# Patient Record
Sex: FEMALE | Race: BLACK OR AFRICAN AMERICAN | NOT HISPANIC OR LATINO | Employment: FULL TIME | URBAN - METROPOLITAN AREA
[De-identification: names, ages, dates, MRNs, and addresses within clinical notes are randomized per-mention and may not be internally consistent; named-entity substitution may affect disease eponyms.]

---

## 2023-04-13 LAB
ABO GROUP (TYPE) IN BLOOD: NORMAL
ANTIBODY SCREEN: NORMAL
CHORIOGONADOTROPIN (MIU/ML) IN SER/PLAS: 561 IU/L
RH FACTOR: NORMAL

## 2023-04-14 LAB — URINE CULTURE: NO GROWTH

## 2023-04-21 ENCOUNTER — APPOINTMENT (OUTPATIENT)
Dept: LAB | Facility: LAB | Age: 28
End: 2023-04-21
Payer: COMMERCIAL

## 2023-04-21 LAB — CHORIOGONADOTROPIN (MIU/ML) IN SER/PLAS: ABNORMAL IU/L

## 2023-05-10 LAB
ABO GROUP (TYPE) IN BLOOD: NORMAL
ANTIBODY SCREEN: NORMAL
ERYTHROCYTE DISTRIBUTION WIDTH (RATIO) BY AUTOMATED COUNT: 14.1 % (ref 11.5–14.5)
ERYTHROCYTE MEAN CORPUSCULAR HEMOGLOBIN CONCENTRATION (G/DL) BY AUTOMATED: 31.2 G/DL (ref 32–36)
ERYTHROCYTE MEAN CORPUSCULAR VOLUME (FL) BY AUTOMATED COUNT: 87 FL (ref 80–100)
ERYTHROCYTES (10*6/UL) IN BLOOD BY AUTOMATED COUNT: 4.69 X10E12/L (ref 4–5.2)
HEMATOCRIT (%) IN BLOOD BY AUTOMATED COUNT: 41 % (ref 36–46)
HEMOGLOBIN (G/DL) IN BLOOD: 12.8 G/DL (ref 12–16)
HEMOGLOBIN A2: 3.1 %
HEMOGLOBIN A: 96.5 %
HEMOGLOBIN F: 0.4 %
HEMOGLOBIN IDENTIFICATION INTERPRETATION: NORMAL
HEPATITIS B VIRUS SURFACE AG PRESENCE IN SERUM: NONREACTIVE
HEPATITIS C VIRUS AB PRESENCE IN SERUM: NONREACTIVE
HIV 1/ 2 AG/AB SCREEN: NONREACTIVE
LEUKOCYTES (10*3/UL) IN BLOOD BY AUTOMATED COUNT: 5.6 X10E9/L (ref 4.4–11.3)
NRBC (PER 100 WBCS) BY AUTOMATED COUNT: 0 /100 WBC (ref 0–0)
PATH REVIEW-HGB IDENTIFICATION: NORMAL
PLATELETS (10*3/UL) IN BLOOD AUTOMATED COUNT: 278 X10E9/L (ref 150–450)
REFLEX ADDED, ANEMIA PANEL: ABNORMAL
RH FACTOR: NORMAL
RUBELLA VIRUS IGG AB: NORMAL
SYPHILIS TOTAL AB: NONREACTIVE

## 2023-08-17 ENCOUNTER — HOSPITAL ENCOUNTER (OUTPATIENT)
Dept: DATA CONVERSION | Facility: HOSPITAL | Age: 28
End: 2023-08-18
Attending: OBSTETRICS & GYNECOLOGY
Payer: COMMERCIAL

## 2023-08-17 DIAGNOSIS — F43.10 POST-TRAUMATIC STRESS DISORDER, UNSPECIFIED: ICD-10-CM

## 2023-08-17 DIAGNOSIS — R10.30 LOWER ABDOMINAL PAIN, UNSPECIFIED: ICD-10-CM

## 2023-08-17 DIAGNOSIS — F41.9 ANXIETY DISORDER, UNSPECIFIED: ICD-10-CM

## 2023-08-17 DIAGNOSIS — R10.2 PELVIC AND PERINEAL PAIN: ICD-10-CM

## 2023-08-17 DIAGNOSIS — Z88.8 ALLERGY STATUS TO OTHER DRUGS, MEDICAMENTS AND BIOLOGICAL SUBSTANCES: ICD-10-CM

## 2023-08-17 DIAGNOSIS — G43.909 MIGRAINE, UNSPECIFIED, NOT INTRACTABLE, WITHOUT STATUS MIGRAINOSUS: ICD-10-CM

## 2023-08-17 DIAGNOSIS — O99.352 DISEASES OF THE NERVOUS SYSTEM COMPLICATING PREGNANCY, SECOND TRIMESTER (HHS-HCC): ICD-10-CM

## 2023-08-17 DIAGNOSIS — Z3A.22 22 WEEKS GESTATION OF PREGNANCY (HHS-HCC): ICD-10-CM

## 2023-08-17 DIAGNOSIS — O26.892 OTHER SPECIFIED PREGNANCY RELATED CONDITIONS, SECOND TRIMESTER (HHS-HCC): ICD-10-CM

## 2023-08-17 DIAGNOSIS — O99.342 OTHER MENTAL DISORDERS COMPLICATING PREGNANCY, SECOND TRIMESTER (HHS-HCC): ICD-10-CM

## 2023-08-18 LAB
ABO GROUP (TYPE) IN BLOOD: NORMAL
ACTIVATED PARTIAL THROMBOPLASTIN TIME IN PPP BY COAGULATION ASSAY: 31 SEC (ref 27–38)
ALANINE AMINOTRANSFERASE (SGPT) (U/L) IN SER/PLAS: 8 U/L (ref 7–45)
ALBUMIN (G/DL) IN SER/PLAS: 3.8 G/DL (ref 3.4–5)
ALKALINE PHOSPHATASE (U/L) IN SER/PLAS: 60 U/L (ref 33–110)
ANION GAP IN SER/PLAS: 14 MMOL/L (ref 10–20)
ANTIBODY SCREEN: NORMAL
ASPARTATE AMINOTRANSFERASE (SGOT) (U/L) IN SER/PLAS: 11 U/L (ref 9–39)
BILIRUBIN TOTAL (MG/DL) IN SER/PLAS: 0.3 MG/DL (ref 0–1.2)
CALCIUM (MG/DL) IN SER/PLAS: 8.9 MG/DL (ref 8.6–10.6)
CARBON DIOXIDE, TOTAL (MMOL/L) IN SER/PLAS: 23 MMOL/L (ref 21–32)
CHLAMYDIA TRACH., AMPLIFIED: NEGATIVE
CHLORIDE (MMOL/L) IN SER/PLAS: 106 MMOL/L (ref 98–107)
CLUE CELLS WET PREP: PRESENT
CREATININE (MG/DL) IN SER/PLAS: 0.55 MG/DL (ref 0.5–1.05)
ERYTHROCYTE DISTRIBUTION WIDTH (RATIO) BY AUTOMATED COUNT: 13.7 % (ref 11.5–14.5)
ERYTHROCYTE MEAN CORPUSCULAR HEMOGLOBIN CONCENTRATION (G/DL) BY AUTOMATED: 33.9 G/DL (ref 32–36)
ERYTHROCYTE MEAN CORPUSCULAR VOLUME (FL) BY AUTOMATED COUNT: 85 FL (ref 80–100)
ERYTHROCYTES (10*6/UL) IN BLOOD BY AUTOMATED COUNT: 4.4 X10E12/L (ref 4–5.2)
FIBRINOGEN (MG/DL) IN PPP BY COAGULATION ASSAY: 295 MG/DL (ref 200–400)
GFR FEMALE: >90 ML/MIN/1.73M2
GLUCOSE (MG/DL) IN SER/PLAS: 71 MG/DL (ref 74–99)
HEMATOCRIT (%) IN BLOOD BY AUTOMATED COUNT: 37.2 % (ref 36–46)
HEMOGLOBIN (G/DL) IN BLOOD: 12.6 G/DL (ref 12–16)
INR IN PPP BY COAGULATION ASSAY: 1.1 (ref 0.9–1.1)
LEUKOCYTES (10*3/UL) IN BLOOD BY AUTOMATED COUNT: 8.1 X10E9/L (ref 4.4–11.3)
N. GONORRHEA, AMPLIFIED: NEGATIVE
NRBC (PER 100 WBCS) BY AUTOMATED COUNT: 0 /100 WBC (ref 0–0)
PLATELETS (10*3/UL) IN BLOOD AUTOMATED COUNT: 251 X10E9/L (ref 150–450)
POTASSIUM (MMOL/L) IN SER/PLAS: 3.8 MMOL/L (ref 3.5–5.3)
PROTEIN TOTAL: 6.6 G/DL (ref 6.4–8.2)
PROTHROMBIN TIME (PT) IN PPP BY COAGULATION ASSAY: 12 SEC (ref 9.8–12.8)
RH FACTOR: NORMAL
SODIUM (MMOL/L) IN SER/PLAS: 139 MMOL/L (ref 136–145)
TRICH WET PREP: ABNORMAL
TRICHOMONAS REFLEX COMMENT: ABNORMAL
TRICHOMONAS VAGINALIS: NEGATIVE
UREA NITROGEN (MG/DL) IN SER/PLAS: 5 MG/DL (ref 6–23)
WBC WET PREP: ABNORMAL /HPF
YEAST PRESENCE WET PREP: ABNORMAL

## 2023-09-19 LAB
ANTIBODY SCREEN: NORMAL
ERYTHROCYTE DISTRIBUTION WIDTH (RATIO) BY AUTOMATED COUNT: 13.7 % (ref 11.5–14.5)
ERYTHROCYTE MEAN CORPUSCULAR HEMOGLOBIN CONCENTRATION (G/DL) BY AUTOMATED: 31.7 G/DL (ref 32–36)
ERYTHROCYTE MEAN CORPUSCULAR VOLUME (FL) BY AUTOMATED COUNT: 89 FL (ref 80–100)
ERYTHROCYTES (10*6/UL) IN BLOOD BY AUTOMATED COUNT: 4.05 X10E12/L (ref 4–5.2)
HEMATOCRIT (%) IN BLOOD BY AUTOMATED COUNT: 36 % (ref 36–46)
HEMOGLOBIN (G/DL) IN BLOOD: 11.4 G/DL (ref 12–16)
LEUKOCYTES (10*3/UL) IN BLOOD BY AUTOMATED COUNT: 7.1 X10E9/L (ref 4.4–11.3)
NRBC (PER 100 WBCS) BY AUTOMATED COUNT: 0 /100 WBC (ref 0–0)
PLATELETS (10*3/UL) IN BLOOD AUTOMATED COUNT: 242 X10E9/L (ref 150–450)
REFLEX ADDED, ANEMIA PANEL: ABNORMAL

## 2023-09-29 VITALS
RESPIRATION RATE: 18 BRPM | HEIGHT: 67 IN | OXYGEN SATURATION: 98 % | WEIGHT: 194 LBS | BODY MASS INDEX: 30.45 KG/M2 | DIASTOLIC BLOOD PRESSURE: 74 MMHG | SYSTOLIC BLOOD PRESSURE: 115 MMHG | TEMPERATURE: 97.7 F | HEART RATE: 91 BPM

## 2023-09-30 NOTE — PROGRESS NOTES
"    Current Stage:   Stage: Antepartum     Subjective Data:   Antepartum:  Antepartum:    on he whole pregnancy, able to tolerate by mouth.29yo [id number] at 22.4wga (by 8wk US) presenting for abdominal pain.     Patient states she felt the baby \"drop,\" and since that time has been having severe lower abdominal and vaginal pain. Feels like a \"jolt\" of pain in her vagina. Denies f/c, diarrhea/constipation, or dysuria. Denies ctx, LOF, VB, or dec FM. Has had nausea  off and on he whole pregnancy, able to tolerate po.    Patient also endorses thick mucousy yellow discharge. Denies vaginal irritation or burning.     Pregnancy notable for:  - possible h/o CVA? no records. patient believes \"due to stress\"; not on anticoagilation    - subcorionic hematoma, being followed on US per pt.  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  PSH: laparoscopic endo ablation x2, hand surgery  Meds: none  Allergies: ASA/NSAIDS (GI irritation/bleeding), topomax  FHx: reviewed and noncontributory  Social: denies e/t/d        Objective Information:    Objective Information:      T   P  R  BP   MAP  SpO2   Value  36.6  80  18  122/79   97  95%  Date/Time  0:01  0:06  0:01  0:01   0:01  0:06  Range  (36.5C - 36.6C )  (76 - 91 )  (18 - 18 )  (115 - 122 )/ (74 - 79 )  (97 - 97 )  (95% - 100% )      Pain reported at  2:00: 0 = None      Physical Exam:   Constitutional: Alert, well-appearing   Obstetric: SSE: scant physiologic discharge, no lesions;  cervix appears normal, no pooling, nitrizine negative    SVE: 0/40/-3  Doppler tones 145  TOCO: quiet    BSUS:   - fetus in cephalic presentation  - fundal placenta   Eyes: EOMI   Respiratory/Thorax: breathing comfortably on room  air "   Cardiovascular: WWP   Gastrointestinal: Patient jumped with pain in response  to lower abdominal exam, however when exam was repeated while patient was distracted with conversation, exam benign and nontender.  No tenderness to palpation over upper abdomen or epigastric area   Musculoskeletal: ROM nl   Neurological: No focal deficits   Psychological: Appropriate affect     Recent Lab Results:    Results:    CBC: 2023 00:39              \     Hgb     /                              \     12.6       /  WBC  ----------------  Plt               8.1       ----------------    251              /     Hct     \                              /     37.2       \            RBC: 4.40     MCV: 85           CMP: 2023 00:39  NA+        Cl-     BUN  /                         139    106    5 L /  --------------------------------  Glucose                ---------------------------  71 L    K+     HCO3-   Creat \                         3.8    23    0.55  \           \  T Bili  /                    \  0.3  /  AST  x ---- x ALT        11 x ---- x 8         /  Alk P   \               /  60  \  Calcium : 8.9     Anion Gap : 14     Albumin : 3.8     T Protein : 6.6           Coagulation: 2023 00:39  PT  /                    12.0  /  -------<    INR          ----------<      1.1  PTT\                    31  \            Fibrinogen: 295            Testing:   NST Interpretation - Baby A:  ·  Baseline      Assessment and Plan:   Assessment:    27yo [id number] at 22.4wga (by 8wk US) presenting for abdominal pain.     Abdominal pain  - initial c/f abruption given response to abdominal exam, however cbc, coags, and fibrinogen wnl ,no vaginal bleeding noted, and pain resolved during stay in triage. Low suspicion for abruption at this time  - cervix closed, no ctx seen on toco or palpated, low suspicion for PTL at this time  - GC/CT and wet prep sent, will call with results.   - nausea resolved with phenergan per  pt request. Able to tolerate by mouth  - repeat abdominal exam benign, low suspicion for other GI/intraabdominal pathology  - Suspect round ligament pain and nerve compression due to fetal movement. Recommended tylenol and hot packs as needed.   - Patient discharged with labor precautions. Instructed to return to care if contractions occur <5 minutes apart, new onset of vaginal bleeding, loss of fluid, decreased fetal movement  or any other symptoms.    Fetal wellbeing  - Doppler tones appropriate  - BSUS of fetus and fluid grossly normal    Dispo: home with follow up scheduled on  with Dr. Allen.     Seen and d/w Dr. Marquise Blanchard MD, PGY-2          Attestation:   Note Completion:  I am a:  Resident/Fellow   Attending Attestation I saw and evaluated the patient.  I personally obtained the key and critical portions of the history and physical exam or was physically present for key and  critical portions performed by the resident/fellow. I reviewed the resident/fellow?s documentation and discussed the patient with the resident/fellow.  I agree with the resident/fellow?s medical decision making as documented in the note.     I personally evaluated the patient on 17-Aug-2023         Electronic Signatures:  Yasemin Camarillo (MD (Fellow))  (Signed 21-Aug-2023 10:29)   Authored: Note Completion   Co-Signer: Current Stage, Subjective Data, Objective Data,  Testing, Assessment and Plan, Note Completion  Iram Blanchard (Resident))  (Signed 18-Aug-2023 07:54)   Authored: Current Stage, Subjective Data, Objective Data,   Testing, Assessment and Plan, Note Completion      Last Updated: 21-Aug-2023 10:29 by Yasemin Camarillo (MD (Fellow))

## 2023-10-01 ENCOUNTER — HOSPITAL ENCOUNTER (OUTPATIENT)
Facility: HOSPITAL | Age: 28
Discharge: HOME | End: 2023-10-02
Attending: OBSTETRICS & GYNECOLOGY | Admitting: OBSTETRICS & GYNECOLOGY
Payer: COMMERCIAL

## 2023-10-01 PROBLEM — F43.10 PTSD (POST-TRAUMATIC STRESS DISORDER): Status: ACTIVE | Noted: 2023-10-01

## 2023-10-01 PROBLEM — F32.A DEPRESSION: Status: ACTIVE | Noted: 2023-10-01

## 2023-10-01 PROBLEM — G43.909 MIGRAINE: Status: ACTIVE | Noted: 2023-10-01

## 2023-10-01 PROBLEM — N39.0 CHRONIC UTI (URINARY TRACT INFECTION): Status: ACTIVE | Noted: 2023-10-01

## 2023-10-01 PROBLEM — Z87.11 PERSONAL HISTORY OF PEPTIC ULCER DISEASE: Status: ACTIVE | Noted: 2023-10-01

## 2023-10-01 PROBLEM — Z86.73 HISTORY OF TIA (TRANSIENT ISCHEMIC ATTACK): Status: ACTIVE | Noted: 2017-01-01

## 2023-10-01 LAB
ERYTHROCYTE [DISTWIDTH] IN BLOOD BY AUTOMATED COUNT: 13.4 % (ref 11.5–14.5)
HCT VFR BLD AUTO: 36.6 % (ref 36–46)
HGB BLD-MCNC: 11.6 G/DL (ref 12–16)
MCH RBC QN AUTO: 27.6 PG (ref 26–34)
MCHC RBC AUTO-ENTMCNC: 31.7 G/DL (ref 32–36)
MCV RBC AUTO: 87 FL (ref 80–100)
NRBC BLD-RTO: 0 /100 WBCS (ref 0–0)
PLATELET # BLD AUTO: 275 X10*3/UL (ref 150–450)
PMV BLD AUTO: 9.8 FL (ref 7.5–11.5)
RBC # BLD AUTO: 4.2 X10*6/UL (ref 4–5.2)
WBC # BLD AUTO: 7.8 X10*3/UL (ref 4.4–11.3)

## 2023-10-01 PROCEDURE — 2500000001 HC RX 250 WO HCPCS SELF ADMINISTERED DRUGS (ALT 637 FOR MEDICARE OP): Performed by: NURSE PRACTITIONER

## 2023-10-01 PROCEDURE — 85610 PROTHROMBIN TIME: CPT | Performed by: STUDENT IN AN ORGANIZED HEALTH CARE EDUCATION/TRAINING PROGRAM

## 2023-10-01 PROCEDURE — 36415 COLL VENOUS BLD VENIPUNCTURE: CPT | Performed by: STUDENT IN AN ORGANIZED HEALTH CARE EDUCATION/TRAINING PROGRAM

## 2023-10-01 PROCEDURE — 87086 URINE CULTURE/COLONY COUNT: CPT | Performed by: NURSE PRACTITIONER

## 2023-10-01 PROCEDURE — 99213 OFFICE O/P EST LOW 20 MIN: CPT | Performed by: STUDENT IN AN ORGANIZED HEALTH CARE EDUCATION/TRAINING PROGRAM

## 2023-10-01 PROCEDURE — 85384 FIBRINOGEN ACTIVITY: CPT | Performed by: STUDENT IN AN ORGANIZED HEALTH CARE EDUCATION/TRAINING PROGRAM

## 2023-10-01 PROCEDURE — 85027 COMPLETE CBC AUTOMATED: CPT | Performed by: STUDENT IN AN ORGANIZED HEALTH CARE EDUCATION/TRAINING PROGRAM

## 2023-10-01 RX ORDER — ACETAMINOPHEN 325 MG/1
975 TABLET ORAL ONCE
Status: COMPLETED | OUTPATIENT
Start: 2023-10-01 | End: 2023-10-01

## 2023-10-01 RX ORDER — ERGOCALCIFEROL 1.25 MG/1
50000 CAPSULE ORAL DAILY
COMMUNITY
Start: 2019-12-23

## 2023-10-01 RX ORDER — B-COMPLEX WITH VITAMIN C
1 TABLET ORAL DAILY
COMMUNITY
Start: 2019-07-25

## 2023-10-01 RX ORDER — CYCLOBENZAPRINE HCL 10 MG
10 TABLET ORAL ONCE
Status: COMPLETED | OUTPATIENT
Start: 2023-10-01 | End: 2023-10-01

## 2023-10-01 RX ADMIN — CYCLOBENZAPRINE 10 MG: 10 TABLET, FILM COATED ORAL at 21:17

## 2023-10-01 RX ADMIN — ACETAMINOPHEN 975 MG: 325 TABLET, FILM COATED ORAL at 21:18

## 2023-10-01 SDOH — HEALTH STABILITY: MENTAL HEALTH: NON-SPECIFIC ACTIVE SUICIDAL THOUGHTS (PAST 1 MONTH): NO

## 2023-10-01 SDOH — SOCIAL STABILITY: SOCIAL INSECURITY: DO YOU FEEL ANYONE HAS EXPLOITED OR TAKEN ADVANTAGE OF YOU FINANCIALLY OR OF YOUR PERSONAL PROPERTY?: NO

## 2023-10-01 SDOH — SOCIAL STABILITY: SOCIAL INSECURITY: ARE YOU OR HAVE YOU BEEN THREATENED OR ABUSED PHYSICALLY, EMOTIONALLY, OR SEXUALLY BY ANYONE?: YES

## 2023-10-01 SDOH — SOCIAL STABILITY: SOCIAL INSECURITY: PHYSICAL ABUSE: DENIES

## 2023-10-01 SDOH — HEALTH STABILITY: MENTAL HEALTH: SUICIDAL BEHAVIOR (LIFETIME): NO

## 2023-10-01 SDOH — ECONOMIC STABILITY: HOUSING INSECURITY: DO YOU FEEL UNSAFE GOING BACK TO THE PLACE WHERE YOU ARE LIVING?: NO

## 2023-10-01 SDOH — HEALTH STABILITY: MENTAL HEALTH: HAVE YOU USED ANY SUBSTANCES (CANABIS, COCAINE, HEROIN, HALLUCINOGENS, INHALANTS, ETC.) IN THE PAST 12 MONTHS?: NO

## 2023-10-01 SDOH — SOCIAL STABILITY: SOCIAL INSECURITY: ABUSE SCREEN: ADULT

## 2023-10-01 SDOH — SOCIAL STABILITY: SOCIAL INSECURITY: HAS ANYONE EVER THREATENED TO HURT YOUR FAMILY OR YOUR PETS?: NO

## 2023-10-01 SDOH — SOCIAL STABILITY: SOCIAL INSECURITY: DOES ANYONE TRY TO KEEP YOU FROM HAVING/CONTACTING OTHER FRIENDS OR DOING THINGS OUTSIDE YOUR HOME?: YES

## 2023-10-01 SDOH — HEALTH STABILITY: MENTAL HEALTH: HAVE YOU USED ANY PRESCRIPTION DRUGS OTHER THAN PRESCRIBED IN THE PAST 12 MONTHS?: NO

## 2023-10-01 SDOH — SOCIAL STABILITY: SOCIAL INSECURITY: ARE THERE ANY APPARENT SIGNS OF INJURIES/BEHAVIORS THAT COULD BE RELATED TO ABUSE/NEGLECT?: NO

## 2023-10-01 SDOH — SOCIAL STABILITY: SOCIAL INSECURITY: HAVE YOU HAD THOUGHTS OF HARMING ANYONE ELSE?: YES

## 2023-10-01 SDOH — SOCIAL STABILITY: SOCIAL INSECURITY: VERBAL ABUSE: DENIES

## 2023-10-01 SDOH — HEALTH STABILITY: MENTAL HEALTH: WISH TO BE DEAD (PAST 1 MONTH): NO

## 2023-10-01 ASSESSMENT — LIFESTYLE VARIABLES
HOW OFTEN DO YOU HAVE 6 OR MORE DRINKS ON ONE OCCASION: NEVER
HOW OFTEN DO YOU HAVE A DRINK CONTAINING ALCOHOL: NEVER
AUDIT-C TOTAL SCORE: 0
SKIP TO QUESTIONS 9-10: 1
AUDIT-C TOTAL SCORE: 0
HOW MANY STANDARD DRINKS CONTAINING ALCOHOL DO YOU HAVE ON A TYPICAL DAY: PATIENT DOES NOT DRINK

## 2023-10-01 ASSESSMENT — PAIN SCALES - GENERAL
PAINLEVEL_OUTOF10: 7

## 2023-10-01 ASSESSMENT — PATIENT HEALTH QUESTIONNAIRE - PHQ9
2. FEELING DOWN, DEPRESSED OR HOPELESS: NOT AT ALL
1. LITTLE INTEREST OR PLEASURE IN DOING THINGS: NOT AT ALL
SUM OF ALL RESPONSES TO PHQ9 QUESTIONS 1 & 2: 0

## 2023-10-02 ENCOUNTER — HOSPITAL ENCOUNTER (OUTPATIENT)
Facility: HOSPITAL | Age: 28
End: 2023-10-02
Attending: OBSTETRICS & GYNECOLOGY | Admitting: OBSTETRICS & GYNECOLOGY
Payer: COMMERCIAL

## 2023-10-02 ENCOUNTER — DOCUMENTATION (OUTPATIENT)
Dept: OBSTETRICS AND GYNECOLOGY | Facility: CLINIC | Age: 28
End: 2023-10-02
Payer: COMMERCIAL

## 2023-10-02 VITALS
TEMPERATURE: 97.3 F | HEART RATE: 88 BPM | DIASTOLIC BLOOD PRESSURE: 55 MMHG | SYSTOLIC BLOOD PRESSURE: 107 MMHG | RESPIRATION RATE: 18 BRPM | OXYGEN SATURATION: 99 %

## 2023-10-02 LAB
APTT PPP: 31 SECONDS (ref 27–38)
FIBRINOGEN PPP-MCNC: 336 MG/DL (ref 200–400)
INR PPP: 1 (ref 0.9–1.1)
PROTHROMBIN TIME: 11.3 SECONDS (ref 9.8–12.8)

## 2023-10-02 PROCEDURE — 99215 OFFICE O/P EST HI 40 MIN: CPT | Mod: 25

## 2023-10-02 ASSESSMENT — PAIN SCALES - GENERAL
PAINLEVEL_OUTOF10: 5 - MODERATE PAIN
PAINLEVEL_OUTOF10: 5 - MODERATE PAIN

## 2023-10-02 NOTE — PROGRESS NOTES
Pt seen on L&D for abdominal pain. Instructed to contact office for Maternity Belt. Spoke with pt order sent to [address].

## 2023-10-02 NOTE — PROGRESS NOTES
Obstetrical Admission History and Physical     Sharlene Smiley is a 28 y.o.  at 28.6 wks by 8.4 wk US.    Chief Complaint: Contractions and Decreased Fetal Movement    Assessment/Plan    Abdominal Pain  - No ctx on TOCO or by palpation, abd soft  - SVE closed/40/-2, plan recheck in 2 hrs  - urine cx sent, will notify if abnormal  - given tylenol and flexeril    Decreased FM  - FHR 130s, appropriate for gestational age  - anterior placenta  - feeling some movement while in triage    Maternal Well Being  - No acute distress  - Vitals stable and WNL    Staffed with Dr. Ly. Report to Dr. Salinas for continuation of care.    Rocio Hanocck, MSN, APRN, WHNP-BC    Update:  Patient assessed after sign out from Rocio Hancock. Cervix still closed/long/high. Given persistent abd pain, did send CBC, coags, fibrinogen which were normal. NST AGA. Abd exam benign, distribution of pain c/w round ligament pain. Reviewed supportive measures. Close follow up within 1 week with Dr. Allen. Reviewed return precautions.    Sharon Pack MD       Active Problems:    Depression    Migraine    Personal history of peptic ulcer disease    PTSD (post-traumatic stress disorder)    History of TIA (transient ischemic attack)    Chronic UTI (urinary tract infection)      Pregnancy Problems (from 23 to present)    - s/p rrcfDNA              Subjective   Sharlene is here with lower back pain, vaginal pressure, shooting vaginal pain, and decreased FM for past few days. She is still feeling movement but not like she is used to. She has felt well and is eating and drinking normally. She denies VB, LOF, vaginal itching, irritation, or odor.      Obstetrical History   OB History    Para Term  AB Living   4 2 2   1 2   SAB IAB Ectopic Multiple Live Births   1       2      # Outcome Date GA Lbr Cory/2nd Weight Sex Delivery Anes PTL Lv   4 Current            3 Term 20 37w0d  3657 g F Vag-Spont None N JOCELYNE   2 Term 17 41w0d   3402 g F Vag-Spont None N JOCELYNE   1 SAB                Past Medical History  Past Medical History:   Diagnosis Date    Chronic UTI (urinary tract infection)     Depression     NOT CURRENTLY ON MEDS    Endometriosis     GERD (gastroesophageal reflux disease)     History of TIA (transient ischemic attack) 2017    Stress induced, PT/OT x 2 months after incident    Migraine     WITH AURA    Personal history of peptic ulcer disease     History of peptic ulcer    PTSD (post-traumatic stress disorder)         Past Surgical History   Past Surgical History:   Procedure Laterality Date    MR HEAD ANGIO WO IV CONTRAST  08/25/2019    MR HEAD ANGIO WO IV CONTRAST 8/25/2019 [facility]    MR NECK ANGIO WO IV CONTRAST  08/25/2019    MR NECK ANGIO WO IV CONTRAST 8/25/2019 [facility]    PELVIC LAPAROSCOPY  2016       Social History  Social History     Tobacco Use    Smoking status: Never     Passive exposure: Never    Smokeless tobacco: Never   Substance Use Topics    Alcohol use: Not Currently     Substance and Sexual Activity   Drug Use Not Currently       Allergies  Nsaids (non-steroidal anti-inflammatory drug) and Topiramate     Medications  Medications Prior to Admission   Medication Sig Dispense Refill Last Dose    ergocalciferol (Vitamin D-2) 1.25 MG (42235 UT) capsule Take 1 capsule (50,000 Units) by mouth once daily.       prenatal vitamin, iron-folic, (Prenatal Vitamin) 27 mg iron-800 mcg folic acid tablet Take 1 tablet by mouth once daily.          Objective    Last Vitals  Temp Pulse Resp BP MAP O2 Sat   36.3 °C (97.3 °F) 86 18 119/79   100 %     Physical Examination  GENERAL: Examination reveals a well developed, well nourished, gravid female in no acute distress. She is alert and cooperative.  ABDOMEN: soft, gravid, nontender, nondistended, no abnormal masses, no epigastric pain  FHR is 130, appropriate for gestational age  Ebro reading:  no contractions noted  CERVIX: closed/40/-2; MEMBRANES are   intact  SKIN: normal coloration and turgor, no rashes  PSYCHOLOGICAL: awake and alert; oriented to person, place, and time    Lab Review

## 2023-10-03 LAB — BACTERIA UR CULT: NO GROWTH

## 2023-10-05 ENCOUNTER — DOCUMENTATION (OUTPATIENT)
Dept: OBSTETRICS AND GYNECOLOGY | Facility: CLINIC | Age: 28
End: 2023-10-05
Payer: COMMERCIAL

## 2023-10-05 NOTE — PROGRESS NOTES
Call from pt with concerns of Howell Weinberg contractions about 1 per hour,  severe hip pain with difficulty walking,+ fetal movement, No LOF. Requesting apt for today or tomorrow. Offered apt for tomorrow. Pt unable to come at available times due to work. Instructed to present to L&D for evaluation. Has Maternity Belt ordered, instructed Tylenol PRN. Pt verbalizes understanding and agrees. Has office apt scheduled 10/13/23.

## 2023-10-13 ENCOUNTER — ROUTINE PRENATAL (OUTPATIENT)
Dept: OBSTETRICS AND GYNECOLOGY | Facility: CLINIC | Age: 28
End: 2023-10-13
Payer: COMMERCIAL

## 2023-10-13 VITALS — WEIGHT: 192.5 LBS | SYSTOLIC BLOOD PRESSURE: 123 MMHG | BODY MASS INDEX: 30.18 KG/M2 | DIASTOLIC BLOOD PRESSURE: 83 MMHG

## 2023-10-13 DIAGNOSIS — Z3A.30 30 WEEKS GESTATION OF PREGNANCY (HHS-HCC): ICD-10-CM

## 2023-10-13 DIAGNOSIS — O09.93 SUPERVISION OF HIGH RISK PREGNANCY IN THIRD TRIMESTER (HHS-HCC): ICD-10-CM

## 2023-10-13 DIAGNOSIS — F32.A DEPRESSION AFFECTING PREGNANCY (HHS-HCC): Primary | ICD-10-CM

## 2023-10-13 DIAGNOSIS — O99.340 DEPRESSION AFFECTING PREGNANCY (HHS-HCC): Primary | ICD-10-CM

## 2023-10-13 PROCEDURE — 99214 OFFICE O/P EST MOD 30 MIN: CPT | Performed by: OBSTETRICS & GYNECOLOGY

## 2023-10-13 PROCEDURE — 99214 OFFICE O/P EST MOD 30 MIN: CPT | Mod: TH | Performed by: OBSTETRICS & GYNECOLOGY

## 2023-10-13 NOTE — PROGRESS NOTES
Concerned about lack of weight gain  States eating all day    Having intense BH ctx  Exam today, no evidence of cervical dilation.    Appears uncomfortable with contractions.      Feeling depressed, tearful due to being uncomfortable.  Was previously on Vraylar, trintellix, doxepin  Doesn't currently have a psychiatrist  Can't fall asleep, if falls asleep, can't stay asleep  Will plan for referral to Dr. Steele    Reporting no FM in early day, but then moves the rest of the day/night  Declined NST this AM

## 2023-10-23 ENCOUNTER — ROUTINE PRENATAL (OUTPATIENT)
Dept: OBSTETRICS AND GYNECOLOGY | Facility: CLINIC | Age: 28
End: 2023-10-23
Payer: COMMERCIAL

## 2023-10-23 VITALS — WEIGHT: 198 LBS | BODY MASS INDEX: 31.04 KG/M2 | SYSTOLIC BLOOD PRESSURE: 119 MMHG | DIASTOLIC BLOOD PRESSURE: 77 MMHG

## 2023-10-23 DIAGNOSIS — Z34.80 SUPERVISION OF OTHER NORMAL PREGNANCY, ANTEPARTUM (HHS-HCC): ICD-10-CM

## 2023-10-23 DIAGNOSIS — O36.8131 DECREASED FETAL MOVEMENTS IN THIRD TRIMESTER, FETUS 1 OF MULTIPLE GESTATION (HHS-HCC): ICD-10-CM

## 2023-10-23 DIAGNOSIS — Z3A.32 32 WEEKS GESTATION OF PREGNANCY (HHS-HCC): Primary | ICD-10-CM

## 2023-10-23 PROBLEM — N39.0 CHRONIC UTI (URINARY TRACT INFECTION): Status: RESOLVED | Noted: 2023-10-01 | Resolved: 2023-10-23

## 2023-10-23 PROBLEM — O36.8130 DECREASED FETAL MOVEMENTS IN THIRD TRIMESTER (HHS-HCC): Status: ACTIVE | Noted: 2023-10-23

## 2023-10-23 PROCEDURE — 99213 OFFICE O/P EST LOW 20 MIN: CPT | Performed by: ADVANCED PRACTICE MIDWIFE

## 2023-10-23 PROCEDURE — 99213 OFFICE O/P EST LOW 20 MIN: CPT | Mod: TH | Performed by: ADVANCED PRACTICE MIDWIFE

## 2023-10-23 ASSESSMENT — ENCOUNTER SYMPTOMS
EYES NEGATIVE: 0
HEMATOLOGIC/LYMPHATIC NEGATIVE: 0
PSYCHIATRIC NEGATIVE: 0
MUSCULOSKELETAL NEGATIVE: 0
ALLERGIC/IMMUNOLOGIC NEGATIVE: 0
CONSTITUTIONAL NEGATIVE: 0
ENDOCRINE NEGATIVE: 0
CARDIOVASCULAR NEGATIVE: 0
GASTROINTESTINAL NEGATIVE: 0
NEUROLOGICAL NEGATIVE: 0
RESPIRATORY NEGATIVE: 0

## 2023-10-23 NOTE — PROGRESS NOTES
Subjective     Sharlene Smiley is a 28 y.o.  at 32w0d with a working estimated date of delivery of 2023, by Ultrasound who presents for a routine prenatal visit.     She denies vaginal bleeding, leakage of fluid, or contractions. Patient states that for the last 2-3 weeks she's had significant DFM. She states that baby stopped moving for 4hrs today, but now is back moving a bit.    Patient would like SVE today as she feels like baby is low.     Objective   Physical Exam:   Weight: 89.8 kg (198 lb)  Expected Total Weight Gain: 5 kg (11 lb)-9 kg (19 lb)   Pregravid BMI: 32.92  BP: 119/77 (Pt hear rate 99)  Fetal Heart Rate: 135 Fundal Height (cm): 32 cm    Dilation: Fingertip Effacement (%): 50 Fetal Station: -3      Problem List Items Addressed This Visit       Supervision of other normal pregnancy, antepartum    Overview       Girl  Declines Tdap due to Religious  Breast  Hormonal IUD immediate PP   Support: mom and FOB          Decreased fetal movements in third trimester    Overview       DFM x2-3 weeks  Discussed with patient about DFM; that I strongly recommend she present to triage for prolonged monitoring/BPP; patient will think about it if she's not able to get testing US tomorrow           Other Visit Diagnoses       32 weeks gestation of pregnancy    -  Primary    Relevant Orders    US MAC OB imaging order            Reviewed s/sx of PTL, warning signs, fetal movement counts, and when to call provider  Follow up in 2 week for a routine prenatal visit.    Sindhu Fontanez, JUAN-VARUN

## 2023-10-24 ENCOUNTER — ANCILLARY PROCEDURE (OUTPATIENT)
Dept: RADIOLOGY | Facility: CLINIC | Age: 28
End: 2023-10-24
Payer: COMMERCIAL

## 2023-10-24 DIAGNOSIS — Z3A.32 32 WEEKS GESTATION OF PREGNANCY (HHS-HCC): ICD-10-CM

## 2023-10-24 PROCEDURE — 76816 OB US FOLLOW-UP PER FETUS: CPT | Performed by: OBSTETRICS & GYNECOLOGY

## 2023-10-24 PROCEDURE — 76816 OB US FOLLOW-UP PER FETUS: CPT

## 2023-10-24 PROCEDURE — 76819 FETAL BIOPHYS PROFIL W/O NST: CPT | Performed by: OBSTETRICS & GYNECOLOGY

## 2023-10-30 ENCOUNTER — TELEPHONE (OUTPATIENT)
Dept: OBSTETRICS AND GYNECOLOGY | Facility: CLINIC | Age: 28
End: 2023-10-30
Payer: COMMERCIAL

## 2023-10-30 NOTE — TELEPHONE ENCOUNTER
"Pt with \" kidney pain\" concerned she has a UTI No fever,no dysuria, + fetal movement/. Apt with RN scheduled 10/31/23 instructed to present to L&D for severe pain, fever, chills Agrees  "

## 2023-10-31 ENCOUNTER — CLINICAL SUPPORT (OUTPATIENT)
Dept: OBSTETRICS AND GYNECOLOGY | Facility: CLINIC | Age: 28
End: 2023-10-31
Payer: COMMERCIAL

## 2023-10-31 DIAGNOSIS — O23.43 URINARY TRACT INFECTION IN MOTHER DURING THIRD TRIMESTER OF PREGNANCY (HHS-HCC): Primary | ICD-10-CM

## 2023-10-31 PROCEDURE — 81003 URINALYSIS AUTO W/O SCOPE: CPT | Performed by: OBSTETRICS & GYNECOLOGY

## 2023-10-31 NOTE — PROGRESS NOTES
"Pt here for urine dip, c/o \" kidney pain. Pt gave urine and left clinic. Called pt \" They told me I could just give a urine and leave.\" No fever, chills, Urine dip tr-bld, + 3 leuks, Neg nitrites. Dr Allen notified To treat empirically MacroBid 1 PO BID x 1 wk Urine cx ordered and sent.    "

## 2023-11-01 ENCOUNTER — HOSPITAL ENCOUNTER (OUTPATIENT)
Dept: PEDIATRIC HEMATOLOGY/ONCOLOGY | Facility: HOSPITAL | Age: 28
Discharge: HOME | End: 2023-11-01
Payer: COMMERCIAL

## 2023-11-01 LAB
POC APPEARANCE, URINE: CLEAR
POC BILIRUBIN, URINE: NEGATIVE
POC BLOOD, URINE: ABNORMAL
POC COLOR, URINE: YELLOW
POC GLUCOSE, URINE: NEGATIVE MG/DL
POC KETONES, URINE: NEGATIVE MG/DL
POC LEUKOCYTES, URINE: ABNORMAL
POC NITRITE,URINE: NEGATIVE
POC PROTEIN, URINE: NEGATIVE MG/DL
POC UROBILINOGEN, URINE: 0.2 EU/DL

## 2023-11-01 PROCEDURE — 87086 URINE CULTURE/COLONY COUNT: CPT | Performed by: STUDENT IN AN ORGANIZED HEALTH CARE EDUCATION/TRAINING PROGRAM

## 2023-11-01 RX ORDER — NITROFURANTOIN 25; 75 MG/1; MG/1
100 CAPSULE ORAL 2 TIMES DAILY
Qty: 14 CAPSULE | Refills: 0 | Status: SHIPPED | OUTPATIENT
Start: 2023-11-01 | End: 2023-11-12 | Stop reason: HOSPADM

## 2023-11-02 LAB — BACTERIA UR CULT: NO GROWTH

## 2023-11-07 ENCOUNTER — ROUTINE PRENATAL (OUTPATIENT)
Dept: OBSTETRICS AND GYNECOLOGY | Facility: CLINIC | Age: 28
End: 2023-11-07
Payer: COMMERCIAL

## 2023-11-07 VITALS — BODY MASS INDEX: 30.88 KG/M2 | SYSTOLIC BLOOD PRESSURE: 126 MMHG | DIASTOLIC BLOOD PRESSURE: 81 MMHG | WEIGHT: 197 LBS

## 2023-11-07 DIAGNOSIS — Z3A.34 34 WEEKS GESTATION OF PREGNANCY (HHS-HCC): Primary | ICD-10-CM

## 2023-11-07 DIAGNOSIS — Z34.80 SUPERVISION OF OTHER NORMAL PREGNANCY, ANTEPARTUM (HHS-HCC): ICD-10-CM

## 2023-11-07 PROBLEM — N76.0 BACTERIAL VAGINOSIS: Status: ACTIVE | Noted: 2023-11-07

## 2023-11-07 PROBLEM — R03.0 ELEVATED BP WITHOUT DIAGNOSIS OF HYPERTENSION: Status: ACTIVE | Noted: 2023-07-31

## 2023-11-07 PROBLEM — Z86.69 HISTORY OF MIGRAINE HEADACHES: Status: ACTIVE | Noted: 2023-11-07

## 2023-11-07 PROBLEM — R42 DIZZINESS: Status: ACTIVE | Noted: 2023-07-31

## 2023-11-07 PROBLEM — O28.5 ABNORMAL GENETIC TEST IN PREGNANCY: Status: ACTIVE | Noted: 2023-06-09

## 2023-11-07 PROBLEM — B37.89 CANDIDIASIS OF BREAST: Status: ACTIVE | Noted: 2023-11-07

## 2023-11-07 PROBLEM — G43.711 INTRACTABLE CHRONIC MIGRAINE WITHOUT AURA AND WITH STATUS MIGRAINOSUS: Status: ACTIVE | Noted: 2021-06-17

## 2023-11-07 PROBLEM — J06.9 ACUTE UPPER RESPIRATORY INFECTION: Status: ACTIVE | Noted: 2020-01-24

## 2023-11-07 PROBLEM — S92.901D CLOSED FRACTURE OF RIGHT FOOT WITH ROUTINE HEALING: Status: ACTIVE | Noted: 2023-09-09

## 2023-11-07 PROBLEM — Z86.59 HISTORY OF DEPRESSION: Status: ACTIVE | Noted: 2023-05-29

## 2023-11-07 PROBLEM — B96.89 BACTERIAL VAGINOSIS: Status: ACTIVE | Noted: 2023-11-07

## 2023-11-07 PROCEDURE — 99213 OFFICE O/P EST LOW 20 MIN: CPT | Mod: TH | Performed by: ADVANCED PRACTICE MIDWIFE

## 2023-11-07 PROCEDURE — 99213 OFFICE O/P EST LOW 20 MIN: CPT | Performed by: ADVANCED PRACTICE MIDWIFE

## 2023-11-07 ASSESSMENT — ENCOUNTER SYMPTOMS
RESPIRATORY NEGATIVE: 0
GASTROINTESTINAL NEGATIVE: 0
MUSCULOSKELETAL NEGATIVE: 0
HEMATOLOGIC/LYMPHATIC NEGATIVE: 0
PSYCHIATRIC NEGATIVE: 0
ENDOCRINE NEGATIVE: 0
EYES NEGATIVE: 0
CONSTITUTIONAL NEGATIVE: 0
ALLERGIC/IMMUNOLOGIC NEGATIVE: 0
CARDIOVASCULAR NEGATIVE: 0
NEUROLOGICAL NEGATIVE: 0

## 2023-11-07 NOTE — PROGRESS NOTES
Assessment/Plan   Problem List Items Addressed This Visit             ICD-10-CM       Ob-Gyn Problems    Supervision of other normal pregnancy, antepartum Z34.80     Other Visit Diagnoses         Codes    34 weeks gestation of pregnancy    -  Primary Z3A.34          Plans to deliver at [facility]   Postpartum contraception options discussed, BREEZY RAMAN  Discussed routine GBS screening, to be completed next visit  Discussed expectant management vs. scheduling term IOL, desires 39wk  Reviewed s/sx of PTL, warning signs, fetal movement counts, and when to call provider  Follow up in 2 week for a routine prenatal visit.    FRANSISCO Mancilla     Sharlene Smiley is a 28 y.o.  at 34w1d with a working estimated date of delivery of 2023, by Ultrasound who presents for a routine prenatal visit. She denies vaginal bleeding, leakage of fluid, decreased fetal movements, or contractions.    Her pregnancy is complicated by:  Pregnancy Problems (from 23 to present)       Problem Noted Resolved    Supervision of other normal pregnancy, antepartum 10/23/2023 by FRANSISCO Castano No    Overview Addendum 10/23/2023  4:35 PM by FRANSISCO Castano       Girl  Declines Tdap due to Taoist  Breast  Hormonal IUD immediate PP   Support: mom and FOB          Decreased fetal movements in third trimester 10/23/2023 by FRANSISCO Castano No    Overview Signed 10/23/2023  4:55 PM by FRANSISCO Castano       DFM x2-3 weeks  Discussed with patient about DFM; that I strongly recommend she present to triage for prolonged monitoring/BPP; patient will think about it if she's not able to get testing US tomorrow                   Objective   Physical Exam:   Weight: 89.4 kg (197 lb)  Expected Total Weight Gain: 5 kg (11 lb)-9 kg (19 lb)   Pregravid BMI: 32.92  BP: 126/81 (Pt heart rate 85)                  Postpartum Depression: Not on file     "    Prenatal Labs  Lab Results   Component Value Date    HGB 11.6 (L) 10/01/2023    HCT 36.6 10/01/2023    ABO O 08/18/2023    HEPBSAG NONREACTIVE 05/09/2023     No results found for: \"GLUF\", \"GLUT1\", \"VUWJLFA1DW\", \"EHSODKV8KB\"  No results found for: \"GBS\"     Imaging  The most recent ultrasound was performed on   with a study GA of   and EFW of  .        "

## 2023-11-12 ENCOUNTER — HOSPITAL ENCOUNTER (OUTPATIENT)
Facility: HOSPITAL | Age: 28
Discharge: HOME | End: 2023-11-12
Attending: OBSTETRICS & GYNECOLOGY | Admitting: OBSTETRICS & GYNECOLOGY
Payer: COMMERCIAL

## 2023-11-12 VITALS
DIASTOLIC BLOOD PRESSURE: 74 MMHG | BODY MASS INDEX: 30.43 KG/M2 | HEIGHT: 67 IN | SYSTOLIC BLOOD PRESSURE: 123 MMHG | TEMPERATURE: 96.8 F | RESPIRATION RATE: 16 BRPM | HEART RATE: 95 BPM | WEIGHT: 193.89 LBS | OXYGEN SATURATION: 99 %

## 2023-11-12 PROCEDURE — 87081 CULTURE SCREEN ONLY: CPT | Mod: AHULAB | Performed by: ADVANCED PRACTICE MIDWIFE

## 2023-11-12 PROCEDURE — 99213 OFFICE O/P EST LOW 20 MIN: CPT | Performed by: ADVANCED PRACTICE MIDWIFE

## 2023-11-12 RX ORDER — ONDANSETRON 4 MG/1
4 TABLET, FILM COATED ORAL EVERY 6 HOURS PRN
Status: DISCONTINUED | OUTPATIENT
Start: 2023-11-12 | End: 2023-11-12 | Stop reason: HOSPADM

## 2023-11-12 RX ORDER — ONDANSETRON HYDROCHLORIDE 2 MG/ML
4 INJECTION, SOLUTION INTRAVENOUS EVERY 6 HOURS PRN
Status: DISCONTINUED | OUTPATIENT
Start: 2023-11-12 | End: 2023-11-12 | Stop reason: HOSPADM

## 2023-11-12 RX ORDER — HYDRALAZINE HYDROCHLORIDE 20 MG/ML
5 INJECTION INTRAMUSCULAR; INTRAVENOUS ONCE AS NEEDED
Status: DISCONTINUED | OUTPATIENT
Start: 2023-11-12 | End: 2023-11-12 | Stop reason: HOSPADM

## 2023-11-12 RX ORDER — LABETALOL HYDROCHLORIDE 5 MG/ML
20 INJECTION, SOLUTION INTRAVENOUS ONCE AS NEEDED
Status: DISCONTINUED | OUTPATIENT
Start: 2023-11-12 | End: 2023-11-12 | Stop reason: HOSPADM

## 2023-11-12 RX ORDER — LIDOCAINE HYDROCHLORIDE 10 MG/ML
0.5 INJECTION INFILTRATION; PERINEURAL ONCE AS NEEDED
Status: DISCONTINUED | OUTPATIENT
Start: 2023-11-12 | End: 2023-11-12 | Stop reason: HOSPADM

## 2023-11-12 RX ORDER — NIFEDIPINE 10 MG/1
10 CAPSULE ORAL ONCE AS NEEDED
Status: DISCONTINUED | OUTPATIENT
Start: 2023-11-12 | End: 2023-11-12 | Stop reason: HOSPADM

## 2023-11-12 SDOH — SOCIAL STABILITY: SOCIAL INSECURITY: DOES ANYONE TRY TO KEEP YOU FROM HAVING/CONTACTING OTHER FRIENDS OR DOING THINGS OUTSIDE YOUR HOME?: NO

## 2023-11-12 SDOH — SOCIAL STABILITY: SOCIAL INSECURITY: HAVE YOU HAD THOUGHTS OF HARMING ANYONE ELSE?: NO

## 2023-11-12 SDOH — SOCIAL STABILITY: SOCIAL INSECURITY: ARE YOU OR HAVE YOU BEEN THREATENED OR ABUSED PHYSICALLY, EMOTIONALLY, OR SEXUALLY BY ANYONE?: NO

## 2023-11-12 SDOH — HEALTH STABILITY: MENTAL HEALTH: WISH TO BE DEAD (PAST 1 MONTH): NO

## 2023-11-12 SDOH — HEALTH STABILITY: MENTAL HEALTH: SUICIDAL BEHAVIOR (LIFETIME): NO

## 2023-11-12 SDOH — SOCIAL STABILITY: SOCIAL INSECURITY: DO YOU FEEL ANYONE HAS EXPLOITED OR TAKEN ADVANTAGE OF YOU FINANCIALLY OR OF YOUR PERSONAL PROPERTY?: NO

## 2023-11-12 SDOH — SOCIAL STABILITY: SOCIAL INSECURITY: ABUSE SCREEN: ADULT

## 2023-11-12 SDOH — SOCIAL STABILITY: SOCIAL INSECURITY: VERBAL ABUSE: DENIES

## 2023-11-12 SDOH — SOCIAL STABILITY: SOCIAL INSECURITY: ARE THERE ANY APPARENT SIGNS OF INJURIES/BEHAVIORS THAT COULD BE RELATED TO ABUSE/NEGLECT?: NO

## 2023-11-12 SDOH — SOCIAL STABILITY: SOCIAL INSECURITY: HAS ANYONE EVER THREATENED TO HURT YOUR FAMILY OR YOUR PETS?: NO

## 2023-11-12 SDOH — HEALTH STABILITY: MENTAL HEALTH: NON-SPECIFIC ACTIVE SUICIDAL THOUGHTS (PAST 1 MONTH): NO

## 2023-11-12 SDOH — SOCIAL STABILITY: SOCIAL INSECURITY: PHYSICAL ABUSE: DENIES

## 2023-11-12 SDOH — ECONOMIC STABILITY: HOUSING INSECURITY: DO YOU FEEL UNSAFE GOING BACK TO THE PLACE WHERE YOU ARE LIVING?: NO

## 2023-11-12 SDOH — HEALTH STABILITY: MENTAL HEALTH: WERE YOU ABLE TO COMPLETE ALL THE BEHAVIORAL HEALTH SCREENINGS?: YES

## 2023-11-12 ASSESSMENT — PAIN SCALES - GENERAL: PAINLEVEL: 8

## 2023-11-12 ASSESSMENT — LIFESTYLE VARIABLES
AUDIT-C TOTAL SCORE: 0
HOW OFTEN DO YOU HAVE A DRINK CONTAINING ALCOHOL: NEVER
HOW MANY STANDARD DRINKS CONTAINING ALCOHOL DO YOU HAVE ON A TYPICAL DAY: PATIENT DOES NOT DRINK
AUDIT-C TOTAL SCORE: 0
HOW OFTEN DO YOU HAVE 6 OR MORE DRINKS ON ONE OCCASION: NEVER
SKIP TO QUESTIONS 9-10: 1

## 2023-11-12 NOTE — H&P
Obstetrical Admission History and Physical  See triage note     Obstetrical History   OB History    Para Term  AB Living   4 2 2   1 2   SAB IAB Ectopic Multiple Live Births   1       2      # Outcome Date GA Lbr Cory/2nd Weight Sex Delivery Anes PTL Lv   4 Current            3 Term 20 37w0d  3204 g M Vag-Spont None N JOCELYNE   2 Term 17 40w0d  3402 g M Vag-Spont None N JOCELYNE   1 SAB                Past Medical History  Past Medical History:   Diagnosis Date    Chronic UTI (urinary tract infection)     Depression     NOT CURRENTLY ON MEDS    Endometriosis     GERD (gastroesophageal reflux disease)     History of TIA (transient ischemic attack) 2017    Stress induced, PT/OT x 2 months after incident    Migraine     WITH AURA    Personal history of peptic ulcer disease     History of peptic ulcer    PTSD (post-traumatic stress disorder)         Past Surgical History   Past Surgical History:   Procedure Laterality Date    APPENDECTOMY      CARPAL TUNNEL RELEASE Left     ENDOMETRIAL ABLATION      x2    MR HEAD ANGIO WO IV CONTRAST  2019    MR HEAD ANGIO WO IV CONTRAST 2019 [facility]    MR NECK ANGIO WO IV CONTRAST  2019    MR NECK ANGIO WO IV CONTRAST 2019 [facility]    PELVIC LAPAROSCOPY  2016       Social History  Social History     Tobacco Use    Smoking status: Never     Passive exposure: Never    Smokeless tobacco: Never   Substance Use Topics    Alcohol use: Not Currently     Substance and Sexual Activity   Drug Use Never       Allergies  Nsaids (non-steroidal anti-inflammatory drug) and Topiramate     Medications  Medications Prior to Admission   Medication Sig Dispense Refill Last Dose    ergocalciferol (Vitamin D-2) 1.25 MG (59844 UT) capsule Take 1 capsule (50,000 Units) by mouth once daily.       [] nitrofurantoin, macrocrystal-monohydrate, (Macrobid) 100 mg capsule Take 1 capsule (100 mg) by mouth 2 times a day for 7 days. 14 capsule 0      prenatal vitamin, iron-folic, (Prenatal Vitamin) 27 mg iron-800 mcg folic acid tablet Take 1 tablet by mouth once daily.          Objective    Last Vitals  Temp Pulse Resp BP MAP O2 Sat   36 °C (96.8 °F) 95 (Simultaneous filing. User may not have seen previous data.) 16 123/74 (Simultaneous filing. User may not have seen previous data.)   99 %     Physical Examination  See triage note

## 2023-11-15 LAB — GP B STREP GENITAL QL CULT: NORMAL

## 2023-11-20 ENCOUNTER — HOSPITAL ENCOUNTER (OUTPATIENT)
Facility: HOSPITAL | Age: 28
Discharge: HOME | End: 2023-11-20
Attending: OBSTETRICS & GYNECOLOGY | Admitting: OBSTETRICS & GYNECOLOGY
Payer: COMMERCIAL

## 2023-11-20 VITALS
SYSTOLIC BLOOD PRESSURE: 130 MMHG | HEART RATE: 99 BPM | TEMPERATURE: 97.9 F | DIASTOLIC BLOOD PRESSURE: 84 MMHG | OXYGEN SATURATION: 100 % | RESPIRATION RATE: 16 BRPM

## 2023-11-20 VITALS
RESPIRATION RATE: 16 BRPM | TEMPERATURE: 98.2 F | WEIGHT: 192.7 LBS | SYSTOLIC BLOOD PRESSURE: 118 MMHG | HEIGHT: 67 IN | OXYGEN SATURATION: 99 % | HEART RATE: 106 BPM | BODY MASS INDEX: 30.25 KG/M2 | DIASTOLIC BLOOD PRESSURE: 73 MMHG

## 2023-11-20 DIAGNOSIS — R19.7 DIARRHEA, UNSPECIFIED TYPE: ICD-10-CM

## 2023-11-20 DIAGNOSIS — R11.0 NAUSEA: ICD-10-CM

## 2023-11-20 DIAGNOSIS — O21.9 NAUSEA AND VOMITING IN PREGNANCY PRIOR TO 22 WEEKS GESTATION (HHS-HCC): Primary | ICD-10-CM

## 2023-11-20 LAB
BILIRUBIN, POC: NEGATIVE
BLOOD URINE, POC: NEGATIVE
CLARITY, POC: CLEAR
COLOR, POC: YELLOW
GLUCOSE URINE, POC: NEGATIVE
KETONES, POC: POSITIVE
LEUKOCYTE EST, POC: NEGATIVE
NITRITE, POC: NORMAL
PH, POC: 7
POC APPEARANCE OF BODY FLUID: NORMAL
SPECIFIC GRAVITY, POC: 1.03
URINE PROTEIN, POC: NEGATIVE
UROBILINOGEN, POC: 0.2

## 2023-11-20 PROCEDURE — 99214 OFFICE O/P EST MOD 30 MIN: CPT

## 2023-11-20 PROCEDURE — 2500000004 HC RX 250 GENERAL PHARMACY W/ HCPCS (ALT 636 FOR OP/ED): Performed by: ADVANCED PRACTICE MIDWIFE

## 2023-11-20 PROCEDURE — 99214 OFFICE O/P EST MOD 30 MIN: CPT | Performed by: ADVANCED PRACTICE MIDWIFE

## 2023-11-20 RX ORDER — LIDOCAINE HYDROCHLORIDE 10 MG/ML
0.5 INJECTION INFILTRATION; PERINEURAL ONCE AS NEEDED
Status: DISCONTINUED | OUTPATIENT
Start: 2023-11-20 | End: 2023-11-20 | Stop reason: HOSPADM

## 2023-11-20 RX ORDER — NIFEDIPINE 10 MG/1
10 CAPSULE ORAL ONCE AS NEEDED
Status: DISCONTINUED | OUTPATIENT
Start: 2023-11-20 | End: 2023-11-20 | Stop reason: HOSPADM

## 2023-11-20 RX ORDER — PROMETHAZINE HYDROCHLORIDE 12.5 MG/1
12.5 TABLET ORAL EVERY 8 HOURS PRN
Qty: 10 TABLET | Refills: 0 | Status: SHIPPED | OUTPATIENT
Start: 2023-11-20 | End: 2023-12-15 | Stop reason: HOSPADM

## 2023-11-20 RX ORDER — HYDRALAZINE HYDROCHLORIDE 20 MG/ML
5 INJECTION INTRAMUSCULAR; INTRAVENOUS ONCE AS NEEDED
Status: DISCONTINUED | OUTPATIENT
Start: 2023-11-20 | End: 2023-11-20 | Stop reason: HOSPADM

## 2023-11-20 RX ORDER — LABETALOL HYDROCHLORIDE 5 MG/ML
20 INJECTION, SOLUTION INTRAVENOUS ONCE AS NEEDED
Status: DISCONTINUED | OUTPATIENT
Start: 2023-11-20 | End: 2023-11-20 | Stop reason: HOSPADM

## 2023-11-20 RX ORDER — ONDANSETRON 4 MG/1
4 TABLET, FILM COATED ORAL EVERY 6 HOURS PRN
Status: DISCONTINUED | OUTPATIENT
Start: 2023-11-20 | End: 2023-11-20 | Stop reason: HOSPADM

## 2023-11-20 RX ORDER — LOPERAMIDE HCL 2 MG
2 TABLET ORAL 3 TIMES DAILY PRN
Qty: 15 TABLET | Refills: 0 | Status: ON HOLD | OUTPATIENT
Start: 2023-11-20 | End: 2023-12-13 | Stop reason: ENTERED-IN-ERROR

## 2023-11-20 RX ORDER — ONDANSETRON HYDROCHLORIDE 2 MG/ML
4 INJECTION, SOLUTION INTRAVENOUS EVERY 6 HOURS PRN
Status: DISCONTINUED | OUTPATIENT
Start: 2023-11-20 | End: 2023-11-20 | Stop reason: HOSPADM

## 2023-11-20 RX ADMIN — SODIUM CHLORIDE, POTASSIUM CHLORIDE, SODIUM LACTATE AND CALCIUM CHLORIDE 1000 ML: 600; 310; 30; 20 INJECTION, SOLUTION INTRAVENOUS at 15:58

## 2023-11-20 RX ADMIN — PROMETHAZINE HYDROCHLORIDE 12.5 MG: 25 INJECTION INTRAMUSCULAR; INTRAVENOUS at 16:21

## 2023-11-20 SDOH — SOCIAL STABILITY: SOCIAL INSECURITY: PHYSICAL ABUSE: DENIES

## 2023-11-20 SDOH — SOCIAL STABILITY: SOCIAL INSECURITY: HAVE YOU HAD THOUGHTS OF HARMING ANYONE ELSE?: NO

## 2023-11-20 SDOH — HEALTH STABILITY: MENTAL HEALTH: HAVE YOU USED ANY PRESCRIPTION DRUGS OTHER THAN PRESCRIBED IN THE PAST 12 MONTHS?: NO

## 2023-11-20 SDOH — SOCIAL STABILITY: SOCIAL INSECURITY: ABUSE SCREEN: ADULT

## 2023-11-20 SDOH — HEALTH STABILITY: MENTAL HEALTH: WERE YOU ABLE TO COMPLETE ALL THE BEHAVIORAL HEALTH SCREENINGS?: YES

## 2023-11-20 SDOH — SOCIAL STABILITY: SOCIAL INSECURITY: ARE YOU OR HAVE YOU BEEN THREATENED OR ABUSED PHYSICALLY, EMOTIONALLY, OR SEXUALLY BY ANYONE?: NO

## 2023-11-20 SDOH — SOCIAL STABILITY: SOCIAL INSECURITY: DO YOU FEEL ANYONE HAS EXPLOITED OR TAKEN ADVANTAGE OF YOU FINANCIALLY OR OF YOUR PERSONAL PROPERTY?: NO

## 2023-11-20 SDOH — SOCIAL STABILITY: SOCIAL INSECURITY: VERBAL ABUSE: DENIES

## 2023-11-20 SDOH — HEALTH STABILITY: MENTAL HEALTH: WISH TO BE DEAD (PAST 1 MONTH): NO

## 2023-11-20 SDOH — HEALTH STABILITY: MENTAL HEALTH: SUICIDAL BEHAVIOR (LIFETIME): NO

## 2023-11-20 SDOH — SOCIAL STABILITY: SOCIAL INSECURITY: ARE THERE ANY APPARENT SIGNS OF INJURIES/BEHAVIORS THAT COULD BE RELATED TO ABUSE/NEGLECT?: NO

## 2023-11-20 SDOH — SOCIAL STABILITY: SOCIAL INSECURITY: HAS ANYONE EVER THREATENED TO HURT YOUR FAMILY OR YOUR PETS?: NO

## 2023-11-20 SDOH — ECONOMIC STABILITY: HOUSING INSECURITY: DO YOU FEEL UNSAFE GOING BACK TO THE PLACE WHERE YOU ARE LIVING?: NO

## 2023-11-20 SDOH — HEALTH STABILITY: MENTAL HEALTH: HAVE YOU USED ANY SUBSTANCES (CANABIS, COCAINE, HEROIN, HALLUCINOGENS, INHALANTS, ETC.) IN THE PAST 12 MONTHS?: NO

## 2023-11-20 SDOH — SOCIAL STABILITY: SOCIAL INSECURITY: DOES ANYONE TRY TO KEEP YOU FROM HAVING/CONTACTING OTHER FRIENDS OR DOING THINGS OUTSIDE YOUR HOME?: NO

## 2023-11-20 SDOH — HEALTH STABILITY: MENTAL HEALTH: NON-SPECIFIC ACTIVE SUICIDAL THOUGHTS (PAST 1 MONTH): NO

## 2023-11-20 ASSESSMENT — PATIENT HEALTH QUESTIONNAIRE - PHQ9
1. LITTLE INTEREST OR PLEASURE IN DOING THINGS: NOT AT ALL
SUM OF ALL RESPONSES TO PHQ9 QUESTIONS 1 & 2: 0
2. FEELING DOWN, DEPRESSED OR HOPELESS: NOT AT ALL

## 2023-11-20 ASSESSMENT — PAIN SCALES - GENERAL
PAINLEVEL: 9
PAINLEVEL_OUTOF10: 10 - WORST POSSIBLE PAIN

## 2023-11-20 ASSESSMENT — LIFESTYLE VARIABLES
AUDIT-C TOTAL SCORE: 0
HOW OFTEN DO YOU HAVE A DRINK CONTAINING ALCOHOL: NEVER
HOW MANY STANDARD DRINKS CONTAINING ALCOHOL DO YOU HAVE ON A TYPICAL DAY: PATIENT DOES NOT DRINK
HOW OFTEN DO YOU HAVE 6 OR MORE DRINKS ON ONE OCCASION: NEVER
AUDIT-C TOTAL SCORE: 0
SKIP TO QUESTIONS 9-10: 1

## 2023-11-20 NOTE — H&P
Obstetrical Admission History and Physical    29 y/o  @ 36 wks presents to triage with ctx on and off since yesterday, unsure how frequent they are, states she was 3 cm last week. Reports normal fetal movement, denies lof and vaginal bleeding.    Obstetrical History   OB History    Para Term  AB Living   4 2 2   1 2   SAB IAB Ectopic Multiple Live Births   1       2      # Outcome Date GA Lbr Cory/2nd Weight Sex Delivery Anes PTL Lv   4 Current            3 Term 20 37w0d  3204 g M Vag-Spont None N JOCELYNE   2 Term 17 40w0d  3402 g M Vag-Spont None N JOCELYNE   1 SAB                Past Medical History  Past Medical History:   Diagnosis Date    Chronic UTI (urinary tract infection)     Depression     NOT CURRENTLY ON MEDS    Endometriosis     GERD (gastroesophageal reflux disease)     History of TIA (transient ischemic attack) 2017    Stress induced, PT/OT x 2 months after incident    Migraine     WITH AURA    Personal history of peptic ulcer disease     History of peptic ulcer    PTSD (post-traumatic stress disorder)         Past Surgical History   Past Surgical History:   Procedure Laterality Date    APPENDECTOMY      CARPAL TUNNEL RELEASE Left     ENDOMETRIAL ABLATION      x2    MR HEAD ANGIO WO IV CONTRAST  2019    [facility] HEAD ANG[facility] CONTRAST [facility]    MR NECK ANGIO WO IV CONTRAST  2019    MR NECK ANGIO WO IV CONTRAST [facility]    PELVIC LAPAROSCOPY  2016       Social History  Social History     Tobacco Use    Smoking status: Never     Passive exposure: Never    Smokeless tobacco: Never   Substance Use Topics    Alcohol use: Not Currently     Substance and Sexual Activity   Drug Use Never       Allergies  Pineapple, Shellfish derived, Nsaids (non-steroidal anti-inflammatory drug), and Topiramate     Medications  Medications Prior to Admission   Medication Sig Dispense Refill Last Dose    ergocalciferol (Vitamin D-2) 1.25 MG (50521  UT) capsule Take 1 capsule (50,000 Units) by mouth once daily.   2023    prenatal vitamin, iron-folic, (Prenatal Vitamin) 27 mg iron-800 mcg folic acid tablet Take 1 tablet by mouth once daily.   2023    vortioxetine hydrobromide (TRINTELLIX ORAL) Take 25 mg by mouth once daily.   2023       Objective    Last Vitals  Temp Pulse Resp BP MAP O2 Sat   36.6 °C (97.9 °F) 99 16 130/84   100 %     Alert & oriented x3, appears comfortable  , mod variability, +accels, no decels  [place] q2-6 min  SVE 3/30/-3    A: 29 y/o  @ 36 wks  False vs latent labor  Reactive NST    P: Discharge home with precautions  Follow up with scheduled appt tomorrow () or as needed    Rozina acevedo cnm

## 2023-11-20 NOTE — H&P
Obstetrical Admission History and Physical    HPI   Sharlene Smiley is a 28 y.o.  at 36w0d, nataly 2023, by Ultrasound who presents to triage having been seen at [facility] a short time ago for diarrhea, cramping and contractions. Pt had been 3/30/-3 and was told she was either in early or false labor and d/c'd home. At this time she reports loose and watery stools several times since this morning. She feels nauseated but has not vomited. She denies fevers or chills. She has had no sick contacts. She has not had anything to eat today but has been able to keep some liquids down. Her previous two pregnancies were full term vaginal births.  Pregnancy notable for:  --history migraines  --abnormal genetic screen with negative amnio  --gbs negative    Obstetrical History   OB History    Para Term  AB Living   4 2 2   1 2   SAB IAB Ectopic Multiple Live Births   1       2      # Outcome Date GA Lbr Cory/2nd Weight Sex Delivery Anes PTL Lv   4 Current            3 Term 20 37w0d  3204 g M Vag-Spont None N JOCELYNE   2 Term 17 40w0d  3402 g M Vag-Spont None N JOCELYNE   1 SAB              Past Medical History  Past Medical History:   Diagnosis Date    Chronic UTI (urinary tract infection)     Depression     NOT CURRENTLY ON MEDS    Endometriosis     GERD (gastroesophageal reflux disease)     History of TIA (transient ischemic attack) 2017    Stress induced, PT/OT x 2 months after incident    Migraine     WITH AURA    Personal history of peptic ulcer disease     History of peptic ulcer    PTSD (post-traumatic stress disorder)       Past Surgical History   Past Surgical History:   Procedure Laterality Date    APPENDECTOMY      CARPAL TUNNEL RELEASE Left     ENDOMETRIAL ABLATION      x2    MR HEAD ANGIO WO IV CONTRAST  2019    MR HEAD ANGIO WO IV CONTRAST [facility]    MR NECK ANGIO WO IV CONTRAST  2019    MR NECK ANGIO WO IV CONTRAST [facility]    PELVIC  LAPAROSCOPY  2016     Social History  Social History     Tobacco Use    Smoking status: Never     Passive exposure: Never    Smokeless tobacco: Never   Substance Use Topics    Alcohol use: Not Currently     Substance and Sexual Activity   Drug Use Never     Allergies  Pineapple, Shellfish derived, Nsaids (non-steroidal anti-inflammatory drug), Pork derived (porcine), and Topiramate     Medications  Medications Prior to Admission   Medication Sig Dispense Refill Last Dose    ergocalciferol (Vitamin D-2) 1.25 MG (20541 UT) capsule Take 1 capsule (50,000 Units) by mouth once daily.   11/20/2023    prenatal vitamin, iron-folic, (Prenatal Vitamin) 27 mg iron-800 mcg folic acid tablet Take 1 tablet by mouth once daily.   11/20/2023    vortioxetine hydrobromide (TRINTELLIX ORAL) Take 25 mg by mouth once daily.   11/19/2023     Review of Systems  Please see HPI for reported pertinent positives, which would supersede this ROS    Constitutional:  Denies fever, chills, wt gain or loss, fatigue  Genito-Urinary:  Denies genital lesion or sores, vaginal dryness, itching  or pain.  No abnormal vaginal discharge or unexplained vaginal bleeding.  No dysuria, urinary incontinence or frequency.  Denies pelvic pain, dysmenorrhea or dyspareunia.---also, see above  Eyes:  Denies vision changes, dryness  ENT:  No hearing loss, sinus pain or congestion, nosebleeds  Cardiovascular:  No chest pain or palpitations  Respiratory:  No SOB, cough, wheezing  GI:  as above  Musculoskeletal:  No new back pain. joint pain, peripheral edema  Skin:  No rash or skin lesion  Neurologic:  No HA, numbness or dizziness  Psychiatric:  No new anxiety or depression  Endocrine:  No loss of hair or hirsutism  Hematologic/lymphatic:  No swollen lymph nodes.  No reported tendency for easy bruising or bleeding    Patient admits to no other systemic complaints    Objective    Last Vitals  Temp Pulse Resp BP MAP O2 Sat   36.8 °C (98.2 °F) 106 16 118/73   99 %      Physical Examination  Physical Exam  Genitourinary:      Vulva normal.   Cardiovascular:      Rate and Rhythm: Tachycardia present.   Pulmonary:      Effort: Pulmonary effort is normal.   Abdominal:      Comments: Gravid, contractions intermittently palpate mild to mod   Musculoskeletal:         General: Normal range of motion.      Cervical back: Normal range of motion.   Neurological:      Mental Status: She is alert and oriented to person, place, and time.   Skin:     General: Skin is warm and dry.   Psychiatric:         Mood and Affect: Mood normal.         Behavior: Behavior normal.         Thought Content: Thought content normal.         Judgment: Judgment normal.     , mod french, +accels, no decels  Corder:  q2-3  Ve: 3-4/60/-1 to -2    Lab Review  Pcot urine dip + for ketones, sg 1.030, trace nitrites    Assessment   at 36w0d  Dehydration   contractions with possible PTL  Nausea/diarrhea    Plan  IV hydration  Antiemetic--pt reports phenergan works for her best  PO challenge after antiemetic on board  Recheck cervix in 2hrs  Dr. Mack aware of status and plan    Addendum:  Ctx's spaced  Ve unchanged and even higher station now that ctx's are spacing  Pt reports another episode of diarrhea  Is drinking but has not eaten; still has some nausea  We discussed rx for immodium and phenergan for home-going  Pt has appt tomorrow and intends to keep.  Dr. Mack has reviewed the strip  tamiko Barbosa

## 2023-11-21 ENCOUNTER — ROUTINE PRENATAL (OUTPATIENT)
Dept: OBSTETRICS AND GYNECOLOGY | Facility: CLINIC | Age: 28
End: 2023-11-21
Payer: COMMERCIAL

## 2023-11-21 ENCOUNTER — PROCEDURE VISIT (OUTPATIENT)
Dept: OBSTETRICS AND GYNECOLOGY | Facility: CLINIC | Age: 28
End: 2023-11-21
Payer: COMMERCIAL

## 2023-11-21 VITALS — DIASTOLIC BLOOD PRESSURE: 74 MMHG | WEIGHT: 194 LBS | BODY MASS INDEX: 30.38 KG/M2 | SYSTOLIC BLOOD PRESSURE: 112 MMHG

## 2023-11-21 DIAGNOSIS — Z86.59 HISTORY OF DEPRESSION: ICD-10-CM

## 2023-11-21 DIAGNOSIS — O36.8131 DECREASED FETAL MOVEMENTS IN THIRD TRIMESTER, FETUS 1 OF MULTIPLE GESTATION (HHS-HCC): ICD-10-CM

## 2023-11-21 DIAGNOSIS — Z34.80 SUPERVISION OF OTHER NORMAL PREGNANCY, ANTEPARTUM (HHS-HCC): ICD-10-CM

## 2023-11-21 DIAGNOSIS — O26.843 UTERINE SIZE-DATE DISCREPANCY IN THIRD TRIMESTER (HHS-HCC): ICD-10-CM

## 2023-11-21 DIAGNOSIS — O26.13: ICD-10-CM

## 2023-11-21 DIAGNOSIS — G43.711 INTRACTABLE CHRONIC MIGRAINE WITHOUT AURA AND WITH STATUS MIGRAINOSUS: ICD-10-CM

## 2023-11-21 DIAGNOSIS — Z36.89 NST (NON-STRESS TEST) REACTIVE (HHS-HCC): ICD-10-CM

## 2023-11-21 DIAGNOSIS — Z3A.36 36 WEEKS GESTATION OF PREGNANCY (HHS-HCC): Primary | ICD-10-CM

## 2023-11-21 DIAGNOSIS — O09.899 SUSCEPTIBLE TO VARICELLA (NON-IMMUNE), CURRENTLY PREGNANT (HHS-HCC): ICD-10-CM

## 2023-11-21 DIAGNOSIS — F33.2 SEVERE EPISODE OF RECURRENT MAJOR DEPRESSIVE DISORDER, WITHOUT PSYCHOTIC FEATURES (MULTI): Chronic | ICD-10-CM

## 2023-11-21 DIAGNOSIS — Z28.39 SUSCEPTIBLE TO VARICELLA (NON-IMMUNE), CURRENTLY PREGNANT (HHS-HCC): ICD-10-CM

## 2023-11-21 DIAGNOSIS — O28.5 ABNORMAL GENETIC TEST IN PREGNANCY: ICD-10-CM

## 2023-11-21 PROBLEM — O41.8X20 SUBCHORIONIC HEMATOMA IN SECOND TRIMESTER (HHS-HCC): Status: ACTIVE | Noted: 2023-05-29

## 2023-11-21 PROBLEM — B96.89 BACTERIAL VAGINOSIS: Status: RESOLVED | Noted: 2023-11-07 | Resolved: 2023-11-21

## 2023-11-21 PROBLEM — R45.89 SUICIDE RISK: Status: ACTIVE | Noted: 2023-10-30

## 2023-11-21 PROBLEM — N76.0 BACTERIAL VAGINOSIS: Status: RESOLVED | Noted: 2023-11-07 | Resolved: 2023-11-21

## 2023-11-21 PROBLEM — O46.8X2 SUBCHORIONIC HEMATOMA IN SECOND TRIMESTER (HHS-HCC): Status: ACTIVE | Noted: 2023-05-29

## 2023-11-21 PROCEDURE — 59025 FETAL NON-STRESS TEST: CPT | Performed by: ADVANCED PRACTICE MIDWIFE

## 2023-11-21 PROCEDURE — 99214 OFFICE O/P EST MOD 30 MIN: CPT | Performed by: ADVANCED PRACTICE MIDWIFE

## 2023-11-21 PROCEDURE — 99214 OFFICE O/P EST MOD 30 MIN: CPT | Mod: SB,TH | Performed by: ADVANCED PRACTICE MIDWIFE

## 2023-11-21 PROCEDURE — 82947 ASSAY GLUCOSE BLOOD QUANT: CPT

## 2023-11-21 ASSESSMENT — ENCOUNTER SYMPTOMS
GASTROINTESTINAL NEGATIVE: 0
MUSCULOSKELETAL NEGATIVE: 0
NEUROLOGICAL NEGATIVE: 0
EYES NEGATIVE: 0
HEMATOLOGIC/LYMPHATIC NEGATIVE: 0
CONSTITUTIONAL NEGATIVE: 0
RESPIRATORY NEGATIVE: 0
PSYCHIATRIC NEGATIVE: 0
ENDOCRINE NEGATIVE: 0
CARDIOVASCULAR NEGATIVE: 0
ALLERGIC/IMMUNOLOGIC NEGATIVE: 0

## 2023-11-21 NOTE — PROGRESS NOTES
"Subjective     Sharlene Smiley is a 28 y.o.  at 36w1d with a working estimated date of delivery of 2023, by Ultrasound who presents for a routine prenatal visit.     Reports decreased fetal movement was on L&D last night     She denies vaginal bleeding, leakage of fluid,  or contractions.    Patient reports her 1hr was done at 23wk not in the correct window- had forgotten and not redone the testing    Reports feeling very dehydrated and unable to eat enough to gain weight in pregnancy     Objective   Physical Exam:   Weight: 88 kg (194 lb)  Expected Total Weight Gain: 5 kg (11 lb)-9 kg (19 lb)   Pregravid BMI: 32.88  BP: 112/74 (pluse 93)  Fetal Heart Rate:  (NST)   Presentation: Vertex  Dilation: 3 Effacement (%): 60 Fetal Station: 0    Finger stick 115- after eating chewy bar  Postpartum Depression: Not on file        Problem List Items Addressed This Visit          Ob-Gyn Problems    Supervision of other normal pregnancy, antepartum    Overview       Girl  Declines Tdap due to Spiritism  Breast  Hormonal IUD immediate PP   Support: mom and FOB          Decreased fetal movements in third trimester    Overview       DFM x2-3 weeks  Discussed with patient about DFM; that I strongly recommend she present to triage for prolonged monitoring/BPP; patient will think about it if she's not able to get testing US tomorrow          Current Assessment & Plan     NST reactive today   Audible and palpable fetal movement on US         Abnormal genetic test in pregnancy    Overview     Formatting of this note might be different from the original. GENETIC CONSULTATION (23): \" She had cfDNA screening ([id number]) which returned screen positive for Trisomy 21/Down syndrome (result under scanned document - External document).  NT ultrasound on 2023 was WNL.\" AMNIO (23): \"NO EVIDENCE OF NUMERICAL ABNORMALITY for chromosomes 13, 18, 21, X and Y.  She does not know fetal sex (her mother does). Discussed " "that this is a preliminary result.  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 Will await amniocentesis full karyotype and microarray.\" [facility] RECS: \"Increased risk for Trisomy 21 on NIPT. S/p amniocentesis. Repeat anatomy and growth ultrasound at 20 weeks. Fetal echo will be needed if amnio is positive for trisomy 21. Given second trimester hematoma and positive NIPT, recommend serial third trimester growth ultrasounds.\" GENETIC COUNSELOR (8/9/23): \"Called Sharlene RIGGS Smiley and left VM that amniocentesis microarray is NEGATIVE.  This completes the amniocentesis genetic testing.   False positive NIPT result for Trisomy 21 (could be due to technical error, vanishing twin or confined placental mosaicism). Due to the latter possibility, suggest growth ultrasounds in the third trimester.\"         Uterine size-date discrepancy in third trimester    Relevant Orders    Glucose, 1 Hour Screen, Pregnancy    US MAC OB imaging order    Low weight gain in pregnancy in third trimester    Overview     Lost 16lb this pregnancy no gain   Normal growth 32wk           Current Assessment & Plan     Repeat order placed today 36w1d          Relevant Orders    US MAC OB imaging order    Referral to Nutrition Services    Referral to Psychiatry       Other    History of depression    Relevant Orders    Referral to Psychiatry     Other Visit Diagnoses       36 weeks gestation of pregnancy    -  Primary    Relevant Orders    Glucose, 1 Hour Screen, Pregnancy    US MAC OB imaging order    Referral to Nutrition Services    NST (non-stress test) reactive            Nutrition referral placed- discussed nutrition and hydration strategies    1hr asap - or unable to have midwifery low risk care - patient plans to complete a 1hr   Reviewed strict movement counts and to return to L&D for any and all changes  Reviewed s/sx of PTL, warning signs, fetal movement counts, and when to call provider    Follow up in 1 week for a " routine prenatal visit.  next visit: 1wk    Millicent Bland, JUAN-VARUN

## 2023-11-22 LAB — GLUCOSE BLD MANUAL STRIP-MCNC: 115 MG/DL (ref 74–99)

## 2023-11-24 ENCOUNTER — ANCILLARY PROCEDURE (OUTPATIENT)
Dept: RADIOLOGY | Facility: CLINIC | Age: 28
End: 2023-11-24
Payer: COMMERCIAL

## 2023-11-24 DIAGNOSIS — O26.13: ICD-10-CM

## 2023-11-24 DIAGNOSIS — O26.843 UTERINE SIZE-DATE DISCREPANCY IN THIRD TRIMESTER (HHS-HCC): ICD-10-CM

## 2023-11-24 DIAGNOSIS — O99.213: ICD-10-CM

## 2023-11-24 DIAGNOSIS — Z3A.36 36 WEEKS GESTATION OF PREGNANCY (HHS-HCC): ICD-10-CM

## 2023-11-24 PROCEDURE — 76819 FETAL BIOPHYS PROFIL W/O NST: CPT | Performed by: OBSTETRICS & GYNECOLOGY

## 2023-11-24 PROCEDURE — 76819 FETAL BIOPHYS PROFIL W/O NST: CPT

## 2023-11-24 PROCEDURE — 76816 OB US FOLLOW-UP PER FETUS: CPT

## 2023-11-24 PROCEDURE — 76816 OB US FOLLOW-UP PER FETUS: CPT | Performed by: OBSTETRICS & GYNECOLOGY

## 2023-11-24 NOTE — PROGRESS NOTES
Note in error   S/P PCI of OM2  CICU  Post PCI EKG, labs, IVF  Continue ASA, plavix, BB, statin, ARB, Renaxa

## 2023-11-27 ENCOUNTER — LAB (OUTPATIENT)
Dept: LAB | Facility: LAB | Age: 28
End: 2023-11-27
Payer: COMMERCIAL

## 2023-11-27 DIAGNOSIS — O26.843 UTERINE SIZE-DATE DISCREPANCY IN THIRD TRIMESTER (HHS-HCC): ICD-10-CM

## 2023-11-27 DIAGNOSIS — Z3A.36 36 WEEKS GESTATION OF PREGNANCY (HHS-HCC): ICD-10-CM

## 2023-11-27 LAB
ERYTHROCYTE [DISTWIDTH] IN BLOOD BY AUTOMATED COUNT: 13.8 % (ref 11.5–14.5)
GLUCOSE 1H P 50 G GLC PO SERPL-MCNC: 106 MG/DL
HCT VFR BLD AUTO: 35.9 % (ref 36–46)
HGB BLD-MCNC: 11.5 G/DL (ref 12–16)
MCH RBC QN AUTO: 27.1 PG (ref 26–34)
MCHC RBC AUTO-ENTMCNC: 32 G/DL (ref 32–36)
MCV RBC AUTO: 85 FL (ref 80–100)
NRBC BLD-RTO: 0 /100 WBCS (ref 0–0)
PLATELET # BLD AUTO: 239 X10*3/UL (ref 150–450)
RBC # BLD AUTO: 4.25 X10*6/UL (ref 4–5.2)
REFLEX ADDED, ANEMIA PANEL: NORMAL
T PALLIDUM AB SER QL: NONREACTIVE
WBC # BLD AUTO: 6.4 X10*3/UL (ref 4.4–11.3)

## 2023-11-27 PROCEDURE — 86780 TREPONEMA PALLIDUM: CPT

## 2023-11-27 PROCEDURE — 85027 COMPLETE CBC AUTOMATED: CPT

## 2023-11-27 PROCEDURE — 82947 ASSAY GLUCOSE BLOOD QUANT: CPT

## 2023-11-27 PROCEDURE — 36415 COLL VENOUS BLD VENIPUNCTURE: CPT

## 2023-11-28 ENCOUNTER — TELEPHONE (OUTPATIENT)
Dept: OBSTETRICS AND GYNECOLOGY | Facility: CLINIC | Age: 28
End: 2023-11-28

## 2023-11-28 ENCOUNTER — ROUTINE PRENATAL (OUTPATIENT)
Dept: OBSTETRICS AND GYNECOLOGY | Facility: CLINIC | Age: 28
End: 2023-11-28
Payer: COMMERCIAL

## 2023-11-28 VITALS — WEIGHT: 198 LBS | BODY MASS INDEX: 31.01 KG/M2 | DIASTOLIC BLOOD PRESSURE: 83 MMHG | SYSTOLIC BLOOD PRESSURE: 120 MMHG

## 2023-11-28 DIAGNOSIS — Z34.80 SUPERVISION OF OTHER NORMAL PREGNANCY, ANTEPARTUM (HHS-HCC): ICD-10-CM

## 2023-11-28 DIAGNOSIS — O41.8X21 SUBCHORIONIC HEMATOMA IN SECOND TRIMESTER, FETUS 1 OF MULTIPLE GESTATION (HHS-HCC): ICD-10-CM

## 2023-11-28 DIAGNOSIS — O28.5 ABNORMAL GENETIC TEST IN PREGNANCY: ICD-10-CM

## 2023-11-28 DIAGNOSIS — O26.13: ICD-10-CM

## 2023-11-28 DIAGNOSIS — O36.8131 DECREASED FETAL MOVEMENTS IN THIRD TRIMESTER, FETUS 1 OF MULTIPLE GESTATION (HHS-HCC): ICD-10-CM

## 2023-11-28 DIAGNOSIS — O46.8X2 SUBCHORIONIC HEMATOMA IN SECOND TRIMESTER, FETUS 1 OF MULTIPLE GESTATION (HHS-HCC): ICD-10-CM

## 2023-11-28 DIAGNOSIS — Z34.80 SUPERVISION OF OTHER NORMAL PREGNANCY, ANTEPARTUM (HHS-HCC): Primary | ICD-10-CM

## 2023-11-28 DIAGNOSIS — O09.899 SUSCEPTIBLE TO VARICELLA (NON-IMMUNE), CURRENTLY PREGNANT (HHS-HCC): ICD-10-CM

## 2023-11-28 DIAGNOSIS — O26.843 UTERINE SIZE-DATE DISCREPANCY IN THIRD TRIMESTER (HHS-HCC): ICD-10-CM

## 2023-11-28 DIAGNOSIS — Z28.39 SUSCEPTIBLE TO VARICELLA (NON-IMMUNE), CURRENTLY PREGNANT (HHS-HCC): ICD-10-CM

## 2023-11-28 PROBLEM — O09.93 SUPERVISION OF HIGH RISK PREGNANCY IN THIRD TRIMESTER (HHS-HCC): Status: ACTIVE | Noted: 2023-11-28

## 2023-11-28 PROBLEM — F99 INSOMNIA DUE TO OTHER MENTAL DISORDER: Status: ACTIVE | Noted: 2023-10-26

## 2023-11-28 PROBLEM — F51.05 INSOMNIA DUE TO OTHER MENTAL DISORDER: Status: ACTIVE | Noted: 2023-10-26

## 2023-11-28 PROCEDURE — 99213 OFFICE O/P EST LOW 20 MIN: CPT | Performed by: ADVANCED PRACTICE MIDWIFE

## 2023-11-28 PROCEDURE — 99213 OFFICE O/P EST LOW 20 MIN: CPT | Mod: TH | Performed by: ADVANCED PRACTICE MIDWIFE

## 2023-11-28 ASSESSMENT — ENCOUNTER SYMPTOMS
RESPIRATORY NEGATIVE: 0
NEUROLOGICAL NEGATIVE: 0
CONSTITUTIONAL NEGATIVE: 0
GASTROINTESTINAL NEGATIVE: 0
HEMATOLOGIC/LYMPHATIC NEGATIVE: 0
CARDIOVASCULAR NEGATIVE: 0
MUSCULOSKELETAL NEGATIVE: 0
EYES NEGATIVE: 0
ENDOCRINE NEGATIVE: 0
PSYCHIATRIC NEGATIVE: 0
ALLERGIC/IMMUNOLOGIC NEGATIVE: 0

## 2023-11-28 NOTE — PROGRESS NOTES
Assessment/Plan   Problem List Items Addressed This Visit             ICD-10-CM       Ob-Gyn Problems    Abnormal genetic test in pregnancy O28.5    Decreased fetal movements in third trimester O36.8130    Low weight gain in pregnancy in third trimester O26.13    Subchorionic hematoma in second trimester O41.8X20, O46.8X2    Supervision of other normal pregnancy, antepartum Z34.80    Susceptible to varicella (non-immune), currently pregnant O09.899, Z28.39    Uterine size-date discrepancy in third trimester O26.843       Discussed expectant management vs. scheduling term IOL, desires 39wk IOL at [facility]   Task RN to schedule   Reviewed s/sx of labor, warning signs, fetal movement counts, and when to call provider  Follow up in 1wk week for a routine prenatal visit.    FRANSISCO Mancilla    Shane Smiley is a 28 y.o.  at 37w1d with a working estimated date of delivery of 2023, by Ultrasound who presents for a routine prenatal visit. She denies vaginal bleeding, leakage of fluid, decreased fetal movements, or contractions.    Her pregnancy is complicated by:  Pregnancy Problems (from 23 to present)       Problem Noted Resolved    Uterine size-date discrepancy in third trimester 2023 by FRANSISCO Mancilla No    Priority:  Medium      Low weight gain in pregnancy in third trimester 2023 by FRANSISCO Mancilla No    Priority:  Medium      Overview Signed 2023  5:29 PM by FRANSISCO Mancilla     Lost 16lb this pregnancy no gain   Normal growth 32wk           Supervision of other normal pregnancy, antepartum 10/23/2023 by FRANSISCO Castano No    Priority:  Medium      Overview Addendum 10/23/2023  4:35 PM by FRANSISCO Castano       Girl  Declines Tdap due to Taoist  Breast  Hormonal IUD immediate PP   Support: mom and FOB          Decreased fetal movements in third trimester 10/23/2023 by  "FRANSISCO Castano No    Priority:  Medium      Overview Signed 10/23/2023  4:55 PM by FRANSISCO Castano       DFM x2-3 weeks  Discussed with patient about DFM; that I strongly recommend she present to triage for prolonged monitoring/BPP; patient will think about it if she's not able to get testing US tomorrow          Abnormal genetic test in pregnancy 6/9/2023 by FRANSISCO Mancilla No    Priority:  Medium      Overview Signed 11/7/2023  4:55 PM by FRANSISCO Mancilla     Formatting of this note might be different from the original. GENETIC CONSULTATION (6/16/23): \" She had cfDNA screening ([id number]) which returned screen positive for Trisomy 21/Down syndrome (result under scanned document - External document).  NT ultrasound on 06/08/2023 was WNL.\" AMNIO (7/6/23): \"NO EVIDENCE OF NUMERICAL ABNORMALITY for chromosomes 13, 18, 21, X and Y.  She does not know fetal sex (her mother does). Discussed that this is a preliminary result.  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 Will await amniocentesis full karyotype and microarray.\" [facility] RECS: \"Increased risk for Trisomy 21 on NIPT. S/p amniocentesis. Repeat anatomy and growth ultrasound at 20 weeks. Fetal echo will be needed if amnio is positive for trisomy 21. Given second trimester hematoma and positive NIPT, recommend serial third trimester growth ultrasounds.\" GENETIC COUNSELOR (8/9/23): \"Called Sharlene Smiley and left VM that amniocentesis microarray is NEGATIVE.  This completes the amniocentesis genetic testing.   False positive NIPT result for Trisomy 21 (could be due to technical error, vanishing twin or confined placental mosaicism). Due to the latter possibility, suggest growth ultrasounds in the third trimester.\"         Subchorionic hematoma in second trimester 5/29/2023 by FRANSISCO Mancilla No    Priority:  Medium      Overview Signed 11/21/2023  5:27 PM " "by FRANSISCO Mancilla     Formatting of this note might be different from the original. M CONSULTATION: \"Subchorionic hematoma still present. Continues to have pelvic pain. Risks of pprom, bleeding,  labor reviewed. Requested refill of lidocaine patch. Recommend serial third trimester growth ultrasounds.\" OB ULTRASOUND (23): \"Chorion-amnion separation is again noted in the left fundal area.\"         Susceptible to varicella (non-immune), currently pregnant 2016 by FRANSISCO Mancilla No    Priority:  Medium      Overview Signed 2023  5:27 PM by FRANSISCO Mancilla     Formatting of this note might be different from the original. RECOMMEND  AVOIDING PEOPLE W/ VIRAL EXANTHEMS.                Objective   Physical Exam:   Weight: 89.8 kg (198 lb)  Expected Total Weight Gain: 5 kg (11 lb)-9 kg (19 lb)   Pregravid BMI: 32.88  BP: 120/83 (Pluse 83)  Fetal Heart Rate: 149   Presentation: Vertex           Postpartum Depression: Not on file        Prenatal Labs  Lab Results   Component Value Date    HGB 11.5 (L) 2023    HCT 35.9 (L) 2023    ABO O 2023    HEPBSAG NONREACTIVE 2023     No results found for: \"GLUF\", \"GLUT1\", \"[id number]\", \"TDYITKI4WX\"  No results found for: \"GBS\"     Imaging  The most recent ultrasound was performed on   with a study GA of   and EFW of  .6lb         "

## 2023-11-28 NOTE — PROGRESS NOTES
Schedule IOL at [facility], 39 weeks or more per S Baldo MARTINEZ-VARUN   Scheduled for 12/13/2023 39.2 weeks at [facility]  Arrive at 0800  Patient notified  Visitor Policy reviewed.

## 2023-11-29 ENCOUNTER — TELEPHONE (OUTPATIENT)
Dept: MATERNAL FETAL MEDICINE | Facility: CLINIC | Age: 28
End: 2023-11-29
Payer: COMMERCIAL

## 2023-11-29 NOTE — TELEPHONE ENCOUNTER
Called to schedule telephone Nutrition consult. Pt referred by Millicent Bland re: pregnancy wt loss, recent L&D admit. Mailbox full, unable to leave voicemail.

## 2023-11-30 ENCOUNTER — TELEPHONE (OUTPATIENT)
Dept: OBSTETRICS AND GYNECOLOGY | Facility: CLINIC | Age: 28
End: 2023-11-30
Payer: COMMERCIAL

## 2023-11-30 NOTE — TELEPHONE ENCOUNTER
Pt lm that  her leg gave out and she fell against a wall and is having 8/10 christiano. Discussed with Coty Gunter CNM and pt advised to go to L&D

## 2023-11-30 NOTE — TELEPHONE ENCOUNTER
----- Message from Sharlene Smiley sent at 11/29/2023  8:43 PM EST -----  Regarding: Maternity Leave  Contact: [phone]  Good morning, my leg have been given out due to the baby's head being so low, Millicent Bland knows. I'm on bedrest. But walking to the bathroom my left leg went out, and I fell into the wall and hit my hip. I'm in some pain 8/10 when I'm moving and positioning in bed, 5/10 when I can finally get comfortable and stay still. Is there anything I can do for the pain??

## 2023-12-02 ENCOUNTER — TELEPHONE (OUTPATIENT)
Dept: OBSTETRICS AND GYNECOLOGY | Facility: HOSPITAL | Age: 28
End: 2023-12-02
Payer: COMMERCIAL

## 2023-12-02 ENCOUNTER — HOSPITAL ENCOUNTER (OUTPATIENT)
Facility: HOSPITAL | Age: 28
Discharge: HOME | End: 2023-12-02
Attending: STUDENT IN AN ORGANIZED HEALTH CARE EDUCATION/TRAINING PROGRAM | Admitting: STUDENT IN AN ORGANIZED HEALTH CARE EDUCATION/TRAINING PROGRAM
Payer: COMMERCIAL

## 2023-12-02 VITALS
BODY MASS INDEX: 31.01 KG/M2 | TEMPERATURE: 97.9 F | HEART RATE: 92 BPM | DIASTOLIC BLOOD PRESSURE: 71 MMHG | RESPIRATION RATE: 19 BRPM | HEIGHT: 67 IN | SYSTOLIC BLOOD PRESSURE: 117 MMHG

## 2023-12-02 PROBLEM — J06.9 ACUTE UPPER RESPIRATORY INFECTION: Status: RESOLVED | Noted: 2020-01-24 | Resolved: 2023-12-02

## 2023-12-02 PROBLEM — Z86.59 HISTORY OF DEPRESSION: Status: RESOLVED | Noted: 2023-05-29 | Resolved: 2023-12-02

## 2023-12-02 PROCEDURE — 99214 OFFICE O/P EST MOD 30 MIN: CPT | Performed by: ADVANCED PRACTICE MIDWIFE

## 2023-12-02 PROCEDURE — 99214 OFFICE O/P EST MOD 30 MIN: CPT

## 2023-12-02 NOTE — TELEPHONE ENCOUNTER
"Patient paged saying she was having DFM. Identity confirmed x2. Patient states that she was put on bed rest due to baby being \"really low\". Yesterday baby was moving a lot. Patient states that she fell yesterday on her stomach. Patient states last night baby was moving like normal. This afternoon baby has had DFM even after eating lots. Discussed with patient about coming in asap for assessment. Patient voiced acceptance and understanding; she will come in asap. All questions answered.   "

## 2023-12-02 NOTE — H&P
Obstetrical Admission History and Physical  Subjective     Chief Complaint: Decreased Fetal Movement      Sharlene Smiley is a 28 y.o.  at 37w5d. DAVID: 2023, by Ultrasound. She is here in triage complaining of DFM.  She denies vaginal bleeding, contractions, or leaking of fluid.  Patient reports baby has had 2-3 more movement since we talked on the phone. While in triage patient reported baby was moving multiple times, and that she could also hear many more movements on the monitor, though didn't always feel those movements. Patient has known anterior placenta.    Patient also reports that recently her hips have been giving out on her and she's been falling as a result. Patient states that she fell yesterday and has fallen before that too. Patient denies any incontinence. Patient states maternity belt no longer helps with this hip pain given baby is too low. Patient declines PT referral.      Obstetrical History   OB History    Para Term  AB Living   4 2 2   1 2   SAB IAB Ectopic Multiple Live Births   1       2      # Outcome Date GA Lbr Cory/2nd Weight Sex Delivery Anes PTL Lv   4 Current            3 Term 20 37w0d  3204 g M Vag-Spont None N JOCELYNE   2 Term 17 40w0d  3402 g M Vag-Spont None N JOCELYNE   1 SAB                Past Medical History  Past Medical History:   Diagnosis Date    Chronic UTI (urinary tract infection)     Depression     NOT CURRENTLY ON MEDS    Endometriosis     GERD (gastroesophageal reflux disease)     History of TIA (transient ischemic attack) 2017    Stress induced, PT/OT x 2 months after incident    Migraine     WITH AURA    Personal history of peptic ulcer disease     History of peptic ulcer    PTSD (post-traumatic stress disorder)         Past Surgical History   Past Surgical History:   Procedure Laterality Date    APPENDECTOMY      CARPAL TUNNEL RELEASE Left     ENDOMETRIAL ABLATION      x2    MR HEAD ANGIO WO IV CONTRAST  2019    MR HEAD ANGIO WO  IV CONTRAST [facility]    MR NECK ANGIO WO IV CONTRAST  08/25/2019    MR NECK ANGIO WO IV CONTRAST 8/25/2019 [facility]    PELVIC LAPAROSCOPY  2016       Social History  Social History     Tobacco Use    Smoking status: Never     Passive exposure: Never    Smokeless tobacco: Never   Substance Use Topics    Alcohol use: Not Currently     Substance and Sexual Activity   Drug Use Never       Allergies  Pineapple, Shellfish derived, Nsaids (non-steroidal anti-inflammatory drug), Pork derived (porcine), and Topiramate     Medications  Medications Prior to Admission   Medication Sig Dispense Refill Last Dose    ergocalciferol (Vitamin D-2) 1.25 MG (84739 UT) capsule Take 1 capsule (50,000 Units) by mouth once daily.       loperamide (Imodium A-D) 2 mg tablet Take 1 tablet (2 mg) by mouth 3 times a day as needed for diarrhea. 15 tablet 0     prenatal vitamin, iron-folic, (Prenatal Vitamin) 27 mg iron-800 mcg folic acid tablet Take 1 tablet by mouth once daily.       promethazine (Phenergan) 12.5 mg tablet Take 1 tablet (12.5 mg) by mouth every 8 hours if needed for nausea or vomiting. 10 tablet 0     vortioxetine hydrobromide (TRINTELLIX ORAL) Take 25 mg by mouth once daily.          Objective    Last Vitals  Temp Pulse Resp BP MAP O2 Sat   36.1 °C (97 °F) 90 19 123/81         Physical Examination  Physical Exam  Constitutional:       Appearance: Normal appearance.   HENT:      Head: Normocephalic.   Pulmonary:      Effort: Pulmonary effort is normal.   Abdominal:      Comments: gravid   Skin:     General: Skin is warm and dry.   Neurological:      Mental Status: She is alert.   Psychiatric:         Mood and Affect: Mood normal.         Behavior: Behavior normal.         Thought Content: Thought content normal.         Judgment: Judgment normal.       Fetal Monitoring      Baseline FHR: 125 per minute  Variability: moderate  Accelerations: yes  Decelerations: none  TOCO: none  Cervical Exam:   3/60/-2 (unchanged from previous SVE in clinic)  +5 fetal movements noted in 2-3 min on bedside US   Cephalic  Membrane status: intact    Lab Review  Labs in chart were reviewed.    Assessment/Plan    Maternal well-being  -VSS  -O+ blood type     Sciatic pain  - Dr Lott in to assess; no foot drop on exam, full LE strength  - encouraged heat, streching, mountain pose prn to see if that helps move baby  - consider pelvic floor PT after birth   - discussed importance of calling/coming in with any future falls due to abruption risk      Fetal well-being/ DFM  - 11/24 US 36% EFW, 8/8 BPP  - reactive NST ; multiple fetal movements palpated by patient, seen on BSU and auscultated  - discussed with patient about anterior placenta; that given that it is not unreasonable for her to not be feeling all the FM that was seen/heard    Dr Lott aware of patient and plan of care     -discussed that although I understood she was in pain, that at 37w there was no medical reason to induce her today   -Reviewed reasons to call CNM on call: vaginal bleeding, loss of fluid, decreased fetal movement, strong consistent contractions, or any other questions/concerns  -RTC in 1 week or prn

## 2023-12-06 ENCOUNTER — ROUTINE PRENATAL (OUTPATIENT)
Dept: OBSTETRICS AND GYNECOLOGY | Facility: CLINIC | Age: 28
End: 2023-12-06
Payer: COMMERCIAL

## 2023-12-06 VITALS — WEIGHT: 197.4 LBS | DIASTOLIC BLOOD PRESSURE: 77 MMHG | BODY MASS INDEX: 30.92 KG/M2 | SYSTOLIC BLOOD PRESSURE: 126 MMHG

## 2023-12-06 DIAGNOSIS — O41.8X21 SUBCHORIONIC HEMATOMA IN SECOND TRIMESTER, FETUS 1 OF MULTIPLE GESTATION (HHS-HCC): ICD-10-CM

## 2023-12-06 DIAGNOSIS — O46.8X2 SUBCHORIONIC HEMATOMA IN SECOND TRIMESTER, FETUS 1 OF MULTIPLE GESTATION (HHS-HCC): ICD-10-CM

## 2023-12-06 DIAGNOSIS — O26.13: ICD-10-CM

## 2023-12-06 DIAGNOSIS — Z28.39 SUSCEPTIBLE TO VARICELLA (NON-IMMUNE), CURRENTLY PREGNANT (HHS-HCC): ICD-10-CM

## 2023-12-06 DIAGNOSIS — O28.5 ABNORMAL GENETIC TEST IN PREGNANCY: ICD-10-CM

## 2023-12-06 DIAGNOSIS — Z34.80 SUPERVISION OF OTHER NORMAL PREGNANCY, ANTEPARTUM (HHS-HCC): ICD-10-CM

## 2023-12-06 DIAGNOSIS — O36.8131 DECREASED FETAL MOVEMENTS IN THIRD TRIMESTER, FETUS 1 OF MULTIPLE GESTATION (HHS-HCC): ICD-10-CM

## 2023-12-06 DIAGNOSIS — O09.93 SUPERVISION OF HIGH RISK PREGNANCY IN THIRD TRIMESTER (HHS-HCC): Primary | ICD-10-CM

## 2023-12-06 DIAGNOSIS — Z3A.38 38 WEEKS GESTATION OF PREGNANCY (HHS-HCC): ICD-10-CM

## 2023-12-06 DIAGNOSIS — O26.843 UTERINE SIZE-DATE DISCREPANCY IN THIRD TRIMESTER (HHS-HCC): ICD-10-CM

## 2023-12-06 DIAGNOSIS — O09.899 SUSCEPTIBLE TO VARICELLA (NON-IMMUNE), CURRENTLY PREGNANT (HHS-HCC): ICD-10-CM

## 2023-12-06 PROCEDURE — 99213 OFFICE O/P EST LOW 20 MIN: CPT | Performed by: OBSTETRICS & GYNECOLOGY

## 2023-12-06 PROCEDURE — 99213 OFFICE O/P EST LOW 20 MIN: CPT | Mod: TH | Performed by: OBSTETRICS & GYNECOLOGY

## 2023-12-06 NOTE — PROGRESS NOTES
Has IOL scheduled for 12/13/23  Requested membrane sweeping today  Labor/FM precautions reviewed.

## 2023-12-13 ENCOUNTER — APPOINTMENT (OUTPATIENT)
Dept: OBSTETRICS AND GYNECOLOGY | Facility: HOSPITAL | Age: 28
End: 2023-12-13
Payer: COMMERCIAL

## 2023-12-13 ENCOUNTER — ANESTHESIA EVENT (OUTPATIENT)
Dept: OBSTETRICS AND GYNECOLOGY | Facility: HOSPITAL | Age: 28
End: 2023-12-13
Payer: COMMERCIAL

## 2023-12-13 ENCOUNTER — HOSPITAL ENCOUNTER (INPATIENT)
Facility: HOSPITAL | Age: 28
LOS: 2 days | Discharge: HOME | End: 2023-12-15
Attending: OBSTETRICS & GYNECOLOGY | Admitting: OBSTETRICS & GYNECOLOGY
Payer: COMMERCIAL

## 2023-12-13 ENCOUNTER — ANESTHESIA (OUTPATIENT)
Dept: OBSTETRICS AND GYNECOLOGY | Facility: HOSPITAL | Age: 28
End: 2023-12-13
Payer: COMMERCIAL

## 2023-12-13 DIAGNOSIS — F43.10 PTSD (POST-TRAUMATIC STRESS DISORDER): ICD-10-CM

## 2023-12-13 DIAGNOSIS — Z34.80 SUPERVISION OF OTHER NORMAL PREGNANCY, ANTEPARTUM (HHS-HCC): ICD-10-CM

## 2023-12-13 DIAGNOSIS — K27.9 PUD (PEPTIC ULCER DISEASE): ICD-10-CM

## 2023-12-13 DIAGNOSIS — R52 POSTPARTUM PAIN (HHS-HCC): Primary | ICD-10-CM

## 2023-12-13 PROBLEM — K21.9 GASTROESOPHAGEAL REFLUX DISEASE: Status: ACTIVE | Noted: 2023-12-13

## 2023-12-13 PROBLEM — Z98.890 HISTORY OF GENERAL ANESTHESIA: Status: ACTIVE | Noted: 2023-12-13

## 2023-12-13 PROBLEM — Z34.90 ENCOUNTER FOR INDUCTION OF LABOR (HHS-HCC): Status: ACTIVE | Noted: 2023-12-13

## 2023-12-13 PROBLEM — O21.9 NAUSEA AND VOMITING IN PREGNANCY (HHS-HCC): Status: ACTIVE | Noted: 2023-07-31

## 2023-12-13 PROBLEM — C26.9 GI CANCER (MULTI): Status: ACTIVE | Noted: 2023-12-13

## 2023-12-13 PROBLEM — M41.9 SCOLIOSIS: Status: ACTIVE | Noted: 2023-12-13

## 2023-12-13 LAB
ABO GROUP (TYPE) IN BLOOD: NORMAL
ANTIBODY SCREEN: NORMAL
ERYTHROCYTE [DISTWIDTH] IN BLOOD BY AUTOMATED COUNT: 14.3 % (ref 11.5–14.5)
HCT VFR BLD AUTO: 34.6 % (ref 36–46)
HGB BLD-MCNC: 11.1 G/DL (ref 12–16)
MCH RBC QN AUTO: 26.4 PG (ref 26–34)
MCHC RBC AUTO-ENTMCNC: 32.1 G/DL (ref 32–36)
MCV RBC AUTO: 82 FL (ref 80–100)
NRBC BLD-RTO: 0 /100 WBCS (ref 0–0)
PLATELET # BLD AUTO: 327 X10*3/UL (ref 150–450)
RBC # BLD AUTO: 4.2 X10*6/UL (ref 4–5.2)
RH FACTOR (ANTIGEN D): NORMAL
WBC # BLD AUTO: 6 X10*3/UL (ref 4.4–11.3)

## 2023-12-13 PROCEDURE — 7210000002 HC LABOR PER HOUR

## 2023-12-13 PROCEDURE — 2500000005 HC RX 250 GENERAL PHARMACY W/O HCPCS: Performed by: ADVANCED PRACTICE MIDWIFE

## 2023-12-13 PROCEDURE — 94760 N-INVAS EAR/PLS OXIMETRY 1: CPT

## 2023-12-13 PROCEDURE — 1100000001 HC PRIVATE ROOM DAILY

## 2023-12-13 PROCEDURE — 10907ZC DRAINAGE OF AMNIOTIC FLUID, THERAPEUTIC FROM PRODUCTS OF CONCEPTION, VIA NATURAL OR ARTIFICIAL OPENING: ICD-10-PCS | Performed by: ADVANCED PRACTICE MIDWIFE

## 2023-12-13 PROCEDURE — 2500000001 HC RX 250 WO HCPCS SELF ADMINISTERED DRUGS (ALT 637 FOR MEDICARE OP): Performed by: ADVANCED PRACTICE MIDWIFE

## 2023-12-13 PROCEDURE — 88307 TISSUE EXAM BY PATHOLOGIST: CPT | Performed by: PATHOLOGY

## 2023-12-13 PROCEDURE — 59409 OBSTETRICAL CARE: CPT | Performed by: ADVANCED PRACTICE MIDWIFE

## 2023-12-13 PROCEDURE — 88307 TISSUE EXAM BY PATHOLOGIST: CPT | Mod: TC,SUR,AHULAB | Performed by: ADVANCED PRACTICE MIDWIFE

## 2023-12-13 PROCEDURE — 85027 COMPLETE CBC AUTOMATED: CPT | Performed by: ADVANCED PRACTICE MIDWIFE

## 2023-12-13 PROCEDURE — 3E033VJ INTRODUCTION OF OTHER HORMONE INTO PERIPHERAL VEIN, PERCUTANEOUS APPROACH: ICD-10-PCS | Performed by: ADVANCED PRACTICE MIDWIFE

## 2023-12-13 PROCEDURE — 2500000004 HC RX 250 GENERAL PHARMACY W/ HCPCS (ALT 636 FOR OP/ED): Performed by: ADVANCED PRACTICE MIDWIFE

## 2023-12-13 PROCEDURE — 86901 BLOOD TYPING SEROLOGIC RH(D): CPT | Performed by: ADVANCED PRACTICE MIDWIFE

## 2023-12-13 PROCEDURE — 59410 OBSTETRICAL CARE: CPT | Performed by: ADVANCED PRACTICE MIDWIFE

## 2023-12-13 PROCEDURE — 36415 COLL VENOUS BLD VENIPUNCTURE: CPT | Performed by: ADVANCED PRACTICE MIDWIFE

## 2023-12-13 PROCEDURE — 7100000016 HC LABOR RECOVERY PER HOUR

## 2023-12-13 RX ORDER — ACETAMINOPHEN 325 MG/1
975 TABLET ORAL EVERY 6 HOURS
Status: DISCONTINUED | OUTPATIENT
Start: 2023-12-13 | End: 2023-12-15 | Stop reason: HOSPADM

## 2023-12-13 RX ORDER — OXYTOCIN/0.9 % SODIUM CHLORIDE 30/500 ML
60 PLASTIC BAG, INJECTION (ML) INTRAVENOUS ONCE AS NEEDED
Status: COMPLETED | OUTPATIENT
Start: 2023-12-13 | End: 2023-12-13

## 2023-12-13 RX ORDER — BISACODYL 10 MG/1
10 SUPPOSITORY RECTAL DAILY PRN
Status: DISCONTINUED | OUTPATIENT
Start: 2023-12-13 | End: 2023-12-15 | Stop reason: HOSPADM

## 2023-12-13 RX ORDER — OXYTOCIN 10 [USP'U]/ML
10 INJECTION, SOLUTION INTRAMUSCULAR; INTRAVENOUS ONCE AS NEEDED
Status: DISCONTINUED | OUTPATIENT
Start: 2023-12-13 | End: 2023-12-13

## 2023-12-13 RX ORDER — METOCLOPRAMIDE 10 MG/1
10 TABLET ORAL EVERY 6 HOURS PRN
Status: DISCONTINUED | OUTPATIENT
Start: 2023-12-13 | End: 2023-12-13

## 2023-12-13 RX ORDER — OXYTOCIN 10 [USP'U]/ML
10 INJECTION, SOLUTION INTRAMUSCULAR; INTRAVENOUS ONCE AS NEEDED
Status: DISCONTINUED | OUTPATIENT
Start: 2023-12-13 | End: 2023-12-15 | Stop reason: HOSPADM

## 2023-12-13 RX ORDER — TERBUTALINE SULFATE 1 MG/ML
0.25 INJECTION SUBCUTANEOUS ONCE AS NEEDED
Status: DISCONTINUED | OUTPATIENT
Start: 2023-12-13 | End: 2023-12-13

## 2023-12-13 RX ORDER — IBUPROFEN 600 MG/1
600 TABLET ORAL EVERY 6 HOURS PRN
Qty: 120 TABLET | Refills: 0 | Status: SHIPPED | OUTPATIENT
Start: 2023-12-13 | End: 2024-01-31

## 2023-12-13 RX ORDER — ONDANSETRON 4 MG/1
4 TABLET, FILM COATED ORAL EVERY 6 HOURS PRN
Status: DISCONTINUED | OUTPATIENT
Start: 2023-12-13 | End: 2023-12-15 | Stop reason: HOSPADM

## 2023-12-13 RX ORDER — SUCRALFATE 1 G/1
1 TABLET ORAL
Qty: 90 TABLET | Refills: 1 | Status: SHIPPED | OUTPATIENT
Start: 2023-12-13 | End: 2024-02-01

## 2023-12-13 RX ORDER — MISOPROSTOL 200 UG/1
800 TABLET ORAL ONCE AS NEEDED
Status: DISCONTINUED | OUTPATIENT
Start: 2023-12-13 | End: 2023-12-15 | Stop reason: HOSPADM

## 2023-12-13 RX ORDER — LIDOCAINE 560 MG/1
1 PATCH PERCUTANEOUS; TOPICAL; TRANSDERMAL
Status: DISCONTINUED | OUTPATIENT
Start: 2023-12-13 | End: 2023-12-15 | Stop reason: HOSPADM

## 2023-12-13 RX ORDER — KETOROLAC TROMETHAMINE 30 MG/ML
30 INJECTION, SOLUTION INTRAMUSCULAR; INTRAVENOUS EVERY 6 HOURS PRN
Status: DISCONTINUED | OUTPATIENT
Start: 2023-12-13 | End: 2023-12-15

## 2023-12-13 RX ORDER — NIFEDIPINE 10 MG/1
10 CAPSULE ORAL ONCE AS NEEDED
Status: DISCONTINUED | OUTPATIENT
Start: 2023-12-13 | End: 2023-12-13

## 2023-12-13 RX ORDER — DIPHENHYDRAMINE HCL 25 MG
25 CAPSULE ORAL EVERY 6 HOURS PRN
Status: DISCONTINUED | OUTPATIENT
Start: 2023-12-13 | End: 2023-12-15 | Stop reason: HOSPADM

## 2023-12-13 RX ORDER — MISOPROSTOL 200 UG/1
800 TABLET ORAL ONCE AS NEEDED
Status: COMPLETED | OUTPATIENT
Start: 2023-12-13 | End: 2023-12-13

## 2023-12-13 RX ORDER — ONDANSETRON HYDROCHLORIDE 2 MG/ML
4 INJECTION, SOLUTION INTRAVENOUS EVERY 6 HOURS PRN
Status: DISCONTINUED | OUTPATIENT
Start: 2023-12-13 | End: 2023-12-13

## 2023-12-13 RX ORDER — SIMETHICONE 80 MG
80 TABLET,CHEWABLE ORAL 4 TIMES DAILY PRN
Status: DISCONTINUED | OUTPATIENT
Start: 2023-12-13 | End: 2023-12-15 | Stop reason: HOSPADM

## 2023-12-13 RX ORDER — ONDANSETRON 4 MG/1
4 TABLET, FILM COATED ORAL EVERY 6 HOURS PRN
Status: DISCONTINUED | OUTPATIENT
Start: 2023-12-13 | End: 2023-12-13

## 2023-12-13 RX ORDER — LABETALOL HYDROCHLORIDE 5 MG/ML
20 INJECTION, SOLUTION INTRAVENOUS ONCE AS NEEDED
Status: DISCONTINUED | OUTPATIENT
Start: 2023-12-13 | End: 2023-12-15 | Stop reason: HOSPADM

## 2023-12-13 RX ORDER — TRANEXAMIC ACID 100 MG/ML
1000 INJECTION, SOLUTION INTRAVENOUS ONCE AS NEEDED
Status: DISCONTINUED | OUTPATIENT
Start: 2023-12-13 | End: 2023-12-15 | Stop reason: HOSPADM

## 2023-12-13 RX ORDER — HYDRALAZINE HYDROCHLORIDE 20 MG/ML
5 INJECTION INTRAMUSCULAR; INTRAVENOUS ONCE AS NEEDED
Status: DISCONTINUED | OUTPATIENT
Start: 2023-12-13 | End: 2023-12-15 | Stop reason: HOSPADM

## 2023-12-13 RX ORDER — METOCLOPRAMIDE HYDROCHLORIDE 5 MG/ML
10 INJECTION INTRAMUSCULAR; INTRAVENOUS EVERY 6 HOURS PRN
Status: DISCONTINUED | OUTPATIENT
Start: 2023-12-13 | End: 2023-12-13

## 2023-12-13 RX ORDER — MISOPROSTOL 200 UG/1
800 TABLET ORAL DAILY
Status: DISCONTINUED | OUTPATIENT
Start: 2023-12-13 | End: 2023-12-14

## 2023-12-13 RX ORDER — ONDANSETRON HYDROCHLORIDE 2 MG/ML
4 INJECTION, SOLUTION INTRAVENOUS EVERY 6 HOURS PRN
Status: DISCONTINUED | OUTPATIENT
Start: 2023-12-13 | End: 2023-12-15 | Stop reason: HOSPADM

## 2023-12-13 RX ORDER — HYDRALAZINE HYDROCHLORIDE 20 MG/ML
5 INJECTION INTRAMUSCULAR; INTRAVENOUS ONCE AS NEEDED
Status: DISCONTINUED | OUTPATIENT
Start: 2023-12-13 | End: 2023-12-13

## 2023-12-13 RX ORDER — METHYLERGONOVINE MALEATE 0.2 MG/ML
0.2 INJECTION INTRAVENOUS ONCE AS NEEDED
Status: DISCONTINUED | OUTPATIENT
Start: 2023-12-13 | End: 2023-12-13

## 2023-12-13 RX ORDER — LABETALOL HYDROCHLORIDE 5 MG/ML
20 INJECTION, SOLUTION INTRAVENOUS ONCE AS NEEDED
Status: DISCONTINUED | OUTPATIENT
Start: 2023-12-13 | End: 2023-12-13

## 2023-12-13 RX ORDER — CARBOPROST TROMETHAMINE 250 UG/ML
250 INJECTION, SOLUTION INTRAMUSCULAR ONCE AS NEEDED
Status: DISCONTINUED | OUTPATIENT
Start: 2023-12-13 | End: 2023-12-15 | Stop reason: HOSPADM

## 2023-12-13 RX ORDER — METHYLERGONOVINE MALEATE 0.2 MG/ML
0.2 INJECTION INTRAVENOUS ONCE AS NEEDED
Status: DISCONTINUED | OUTPATIENT
Start: 2023-12-13 | End: 2023-12-15 | Stop reason: HOSPADM

## 2023-12-13 RX ORDER — POLYETHYLENE GLYCOL 3350 17 G/17G
17 POWDER, FOR SOLUTION ORAL 2 TIMES DAILY PRN
Status: DISCONTINUED | OUTPATIENT
Start: 2023-12-13 | End: 2023-12-15 | Stop reason: HOSPADM

## 2023-12-13 RX ORDER — LIDOCAINE HYDROCHLORIDE 10 MG/ML
30 INJECTION INFILTRATION; PERINEURAL ONCE AS NEEDED
Status: DISCONTINUED | OUTPATIENT
Start: 2023-12-13 | End: 2023-12-13

## 2023-12-13 RX ORDER — SODIUM CHLORIDE, SODIUM LACTATE, POTASSIUM CHLORIDE, CALCIUM CHLORIDE 600; 310; 30; 20 MG/100ML; MG/100ML; MG/100ML; MG/100ML
125 INJECTION, SOLUTION INTRAVENOUS CONTINUOUS
Status: DISCONTINUED | OUTPATIENT
Start: 2023-12-13 | End: 2023-12-13

## 2023-12-13 RX ORDER — LOPERAMIDE HYDROCHLORIDE 2 MG/1
4 CAPSULE ORAL EVERY 2 HOUR PRN
Status: DISCONTINUED | OUTPATIENT
Start: 2023-12-13 | End: 2023-12-15 | Stop reason: HOSPADM

## 2023-12-13 RX ORDER — ADHESIVE BANDAGE
10 BANDAGE TOPICAL
Status: DISCONTINUED | OUTPATIENT
Start: 2023-12-13 | End: 2023-12-15 | Stop reason: HOSPADM

## 2023-12-13 RX ORDER — DIPHENHYDRAMINE HYDROCHLORIDE 50 MG/ML
25 INJECTION INTRAMUSCULAR; INTRAVENOUS EVERY 6 HOURS PRN
Status: DISCONTINUED | OUTPATIENT
Start: 2023-12-13 | End: 2023-12-15 | Stop reason: HOSPADM

## 2023-12-13 RX ORDER — ACETAMINOPHEN 500 MG
1000 TABLET ORAL EVERY 6 HOURS PRN
Qty: 120 TABLET | Refills: 0 | Status: SHIPPED | OUTPATIENT
Start: 2023-12-13

## 2023-12-13 RX ORDER — OXYTOCIN/0.9 % SODIUM CHLORIDE 30/500 ML
60 PLASTIC BAG, INJECTION (ML) INTRAVENOUS ONCE AS NEEDED
Status: DISCONTINUED | OUTPATIENT
Start: 2023-12-13 | End: 2023-12-15 | Stop reason: HOSPADM

## 2023-12-13 RX ORDER — OXYTOCIN/0.9 % SODIUM CHLORIDE 30/500 ML
2-30 PLASTIC BAG, INJECTION (ML) INTRAVENOUS CONTINUOUS
Status: DISCONTINUED | OUTPATIENT
Start: 2023-12-13 | End: 2023-12-13

## 2023-12-13 RX ORDER — CARBOPROST TROMETHAMINE 250 UG/ML
250 INJECTION, SOLUTION INTRAMUSCULAR ONCE AS NEEDED
Status: DISCONTINUED | OUTPATIENT
Start: 2023-12-13 | End: 2023-12-13

## 2023-12-13 RX ORDER — LOPERAMIDE HYDROCHLORIDE 2 MG/1
4 CAPSULE ORAL EVERY 2 HOUR PRN
Status: DISCONTINUED | OUTPATIENT
Start: 2023-12-13 | End: 2023-12-13

## 2023-12-13 RX ORDER — TRANEXAMIC ACID 100 MG/ML
1000 INJECTION, SOLUTION INTRAVENOUS ONCE AS NEEDED
Status: DISCONTINUED | OUTPATIENT
Start: 2023-12-13 | End: 2023-12-13

## 2023-12-13 RX ORDER — NIFEDIPINE 10 MG/1
10 CAPSULE ORAL ONCE AS NEEDED
Status: DISCONTINUED | OUTPATIENT
Start: 2023-12-13 | End: 2023-12-15 | Stop reason: HOSPADM

## 2023-12-13 RX ADMIN — BENZOCAINE AND LEVOMENTHOL 1 APPLICATION: 200; 5 SPRAY TOPICAL at 22:22

## 2023-12-13 RX ADMIN — SODIUM CHLORIDE, POTASSIUM CHLORIDE, SODIUM LACTATE AND CALCIUM CHLORIDE 125 ML/HR: 600; 310; 30; 20 INJECTION, SOLUTION INTRAVENOUS at 16:50

## 2023-12-13 RX ADMIN — Medication 1 EACH: at 22:22

## 2023-12-13 RX ADMIN — SODIUM CHLORIDE, POTASSIUM CHLORIDE, SODIUM LACTATE AND CALCIUM CHLORIDE 125 ML/HR: 600; 310; 30; 20 INJECTION, SOLUTION INTRAVENOUS at 10:23

## 2023-12-13 RX ADMIN — Medication 1 INHALATION: at 16:36

## 2023-12-13 RX ADMIN — MISOPROSTOL 800 MCG: 200 TABLET ORAL at 19:13

## 2023-12-13 RX ADMIN — Medication 2 MILLI-UNITS/MIN: at 10:23

## 2023-12-13 RX ADMIN — Medication 60 MILLI-UNITS/MIN: at 16:49

## 2023-12-13 RX ADMIN — KETOROLAC TROMETHAMINE 30 MG: 30 INJECTION, SOLUTION INTRAMUSCULAR; INTRAVENOUS at 22:30

## 2023-12-13 SDOH — SOCIAL STABILITY: SOCIAL INSECURITY: HAS ANYONE EVER THREATENED TO HURT YOUR FAMILY OR YOUR PETS?: NO

## 2023-12-13 SDOH — ECONOMIC STABILITY: TRANSPORTATION INSECURITY: IN THE PAST 12 MONTHS, HAS LACK OF TRANSPORTATION KEPT YOU FROM MEDICAL APPOINTMENTS OR FROM GETTING MEDICATIONS?: NO

## 2023-12-13 SDOH — ECONOMIC STABILITY: HOUSING INSECURITY: DO YOU FEEL UNSAFE GOING BACK TO THE PLACE WHERE YOU ARE LIVING?: NO

## 2023-12-13 SDOH — ECONOMIC STABILITY: TRANSPORTATION INSECURITY
IN THE PAST 12 MONTHS, HAS LACK OF TRANSPORTATION KEPT YOU FROM MEETINGS, WORK, OR FROM GETTING THINGS NEEDED FOR DAILY LIVING?: NO

## 2023-12-13 SDOH — SOCIAL STABILITY: SOCIAL INSECURITY: VERBAL ABUSE: DENIES

## 2023-12-13 SDOH — HEALTH STABILITY: MENTAL HEALTH: STRENGTHS (MUST CHOOSE TWO): SUPPORT FROM FRIENDS;SUPPORT FROM FAMILY

## 2023-12-13 SDOH — ECONOMIC STABILITY: FOOD INSECURITY: WITHIN THE PAST 12 MONTHS, THE FOOD YOU BOUGHT JUST DIDN'T LAST AND YOU DIDN'T HAVE MONEY TO GET MORE.: NEVER TRUE

## 2023-12-13 SDOH — SOCIAL STABILITY: SOCIAL INSECURITY: PHYSICAL ABUSE: DENIES

## 2023-12-13 SDOH — HEALTH STABILITY: MENTAL HEALTH: NON-SPECIFIC ACTIVE SUICIDAL THOUGHTS (PAST 1 MONTH): NO

## 2023-12-13 SDOH — HEALTH STABILITY: MENTAL HEALTH: CURRENT SMOKER: 0

## 2023-12-13 SDOH — SOCIAL STABILITY: SOCIAL INSECURITY: DO YOU FEEL ANYONE HAS EXPLOITED OR TAKEN ADVANTAGE OF YOU FINANCIALLY OR OF YOUR PERSONAL PROPERTY?: NO

## 2023-12-13 SDOH — ECONOMIC STABILITY: FOOD INSECURITY: WITHIN THE PAST 12 MONTHS, YOU WORRIED THAT YOUR FOOD WOULD RUN OUT BEFORE YOU GOT MONEY TO BUY MORE.: NEVER TRUE

## 2023-12-13 SDOH — SOCIAL STABILITY: SOCIAL INSECURITY: DOES ANYONE TRY TO KEEP YOU FROM HAVING/CONTACTING OTHER FRIENDS OR DOING THINGS OUTSIDE YOUR HOME?: NO

## 2023-12-13 SDOH — ECONOMIC STABILITY: TRANSPORTATION INSECURITY
IN THE PAST 12 MONTHS, HAS THE LACK OF TRANSPORTATION KEPT YOU FROM MEDICAL APPOINTMENTS OR FROM GETTING MEDICATIONS?: NO

## 2023-12-13 SDOH — HEALTH STABILITY: MENTAL HEALTH: HAVE YOU USED ANY PRESCRIPTION DRUGS OTHER THAN PRESCRIBED IN THE PAST 12 MONTHS?: NO

## 2023-12-13 SDOH — ECONOMIC STABILITY: TRANSPORTATION INSECURITY

## 2023-12-13 SDOH — SOCIAL STABILITY: SOCIAL INSECURITY: ARE YOU OR HAVE YOU BEEN THREATENED OR ABUSED PHYSICALLY, EMOTIONALLY, OR SEXUALLY BY ANYONE?: NO

## 2023-12-13 SDOH — HEALTH STABILITY: MENTAL HEALTH: SUICIDAL BEHAVIOR (LIFETIME): NO

## 2023-12-13 SDOH — SOCIAL STABILITY: SOCIAL INSECURITY: ABUSE SCREEN: ADULT

## 2023-12-13 SDOH — HEALTH STABILITY: MENTAL HEALTH: WERE YOU ABLE TO COMPLETE ALL THE BEHAVIORAL HEALTH SCREENINGS?: YES

## 2023-12-13 SDOH — ECONOMIC STABILITY: FOOD INSECURITY: WITHIN THE PAST 12 MONTHS, YOU WORRIED THAT YOUR FOOD WOULD RUN OUT BEFORE YOU GOT THE MONEY TO BUY MORE.: NEVER TRUE

## 2023-12-13 SDOH — SOCIAL STABILITY: SOCIAL INSECURITY: HAVE YOU HAD THOUGHTS OF HARMING ANYONE ELSE?: NO

## 2023-12-13 SDOH — HEALTH STABILITY: MENTAL HEALTH: WISH TO BE DEAD (PAST 1 MONTH): NO

## 2023-12-13 SDOH — SOCIAL STABILITY: SOCIAL INSECURITY: ARE THERE ANY APPARENT SIGNS OF INJURIES/BEHAVIORS THAT COULD BE RELATED TO ABUSE/NEGLECT?: NO

## 2023-12-13 SDOH — ECONOMIC STABILITY: FOOD INSECURITY

## 2023-12-13 SDOH — HEALTH STABILITY: MENTAL HEALTH: HAVE YOU USED ANY SUBSTANCES (CANABIS, COCAINE, HEROIN, HALLUCINOGENS, INHALANTS, ETC.) IN THE PAST 12 MONTHS?: NO

## 2023-12-13 SDOH — ECONOMIC STABILITY: FOOD INSECURITY: WITHIN THE PAST 12 MONTHS, THE FOOD YOU BOUGHT JUST DIDN’T LAST AND YOU DIDN’T HAVE MONEY TO GET MORE.: NEVER TRUE

## 2023-12-13 ASSESSMENT — ACTIVITIES OF DAILY LIVING (ADL)
HOW_WELL_CAN_YOU_FEED_YOURSELF: INDEPENDENTLY
DRESSING: INDEPENDENT
HOW_WELL_CAN_YOU_COMPLETE_GROOMING_TASKS: INDEPENDENTLY
FEEDING: INDEPENDENT
ADEQUATE_TO_COMPLETE_ADL: YES
LACK_OF_TRANSPORTATION: NO
ADEQUATE_TO_COMPLETE_ADL: YES
WALKS_IN_HOME: INDEPENDENTLY
HOW_WELL_CAN_YOU_USE_BATHROOM_BY_YOURSELF: INDEPENDENTLY
HOW_WELL_CAN_YOU_BATHE_YOURSELF: INDEPENDENTLY
ADL_BEFORE_ADMISSION: BOTH
HOW_WELL_CAN_YOU_DRESS_YOURSELF: INDEPENDENTLY
HEARING_RIGHT_EAR: NO PROBLEMS
TOILETING: INDEPENDENT
ADL_BEFORE_ADMISSION: RIGHT
HEARING_LEFT_EAR: NO PROBLEMS
BATHING: INDEPENDENT

## 2023-12-13 ASSESSMENT — LIFESTYLE VARIABLES
AUDIT-C TOTAL SCORE: 0
SKIP TO QUESTIONS 9-10: 1
HOW MANY STANDARD DRINKS CONTAINING ALCOHOL DO YOU HAVE ON A TYPICAL DAY: PATIENT DOES NOT DRINK
HOW OFTEN DO YOU HAVE 6 OR MORE DRINKS ON ONE OCCASION: NEVER
AUDIT-C TOTAL SCORE: 0
HOW OFTEN DO YOU HAVE A DRINK CONTAINING ALCOHOL: NEVER

## 2023-12-13 ASSESSMENT — PAIN SCALES - GENERAL
PAINLEVEL_OUTOF10: 0 - NO PAIN
PAINLEVEL_OUTOF10: 8
PAINLEVEL_OUTOF10: 0 - NO PAIN

## 2023-12-13 NOTE — PROGRESS NOTES
S: Pt coping well with ctx pain. No concerns or complaints    O:   /87   Pulse 79   Temp 36.1 °C (97 °F) (Temporal)   Resp 18      FHR: 135 BPM, moderate variability, +accels, no decels  TOCO: contractions 2-3.5 mins  VE: 5/70/-2  Membranes: AROM for clear fluid    A: 28 y.o.  39w2d  Latent labor  Cat 1 tracing  GBS negative    P: Continue IOL with Pitocin  cEFM  Diet: clears  Frequent position changes  Nitrous Per pt request  Dr. Mack updated  Anticipate     Esther Steele, JUAN-DALEM

## 2023-12-13 NOTE — ANESTHESIA PREPROCEDURE EVALUATION
"Patient: Sharlene Smiley    Evaluation Method: In-person visit    Procedure Information    Date: 23  Procedure: Labor Consult         Relevant Problems   Anesthesia   (+) History of general anesthesia      Cardiovascular (within normal limits)      Endocrine (within normal limits)      GI   (+) Gastroesophageal reflux disease   (+) PUD (peptic ulcer disease)      /Renal (within normal limits)      Neuro/Psych   (+) Depression   (+) History of migraine headaches   (+) PTSD (post-traumatic stress disorder)   (+) Post partum depression   (+) Severe episode of recurrent major depressive disorder, without psychotic features (CMS/HCC)      Pulmonary (within normal limits)      Hematology   (+) Anemia      Musculoskeletal  Within the last weeks, pt's \"hips were giving out\" and she experienced a fall. Broke 5th metatarsal in right foot. Has been on bedrest for multiple weeks.    (+) Scoliosis      Infectious Disease   (+) Candidiasis of breast      Gravid and    (+) Encounter for induction of labor       Clinical information reviewed:   Tobacco  Allergies  Meds  Problems  Med Hx  Surg Hx   Fam Hx          NPO Detail:  No data recorded     OB/Gyn Evaluation    Present Pregnancy    Patient is pregnant now ().   Obstetric History           Pt is currently nauseas.   Mild scoliosis, sciatic neuropathy, currently broken 5th metatarsal on right foot.      Physical Exam    Airway  Mallampati: III  TM distance: >3 FB  Neck ROM: full     Cardiovascular   Rhythm: regular  Rate: normal     Dental - normal exam       Pulmonary   Breath sounds clear to auscultation     Abdominal        Pt has had 2 NCB's. She desires to have a NCB with this L&D, however she has states she experiencing more pain with this pregnancy. So, her plan is NCB, but she is interested in an epidural.   Anesthesia Plan    ASA 2     epidural and other     The patient is not a current smoker.    Anesthetic plan and risks discussed with " patient.  Use of blood products discussed with patient who consented to blood products.    Plan discussed with CRNA.

## 2023-12-13 NOTE — L&D DELIVERY NOTE
OB Delivery Note  2023  Sharlene Smiley  28 y.o.   Vaginal, Spontaneous        Gestational Age: 39w2d  /Para:   Quantitative Blood Loss: Admission to Discharge: 50 mL (2023  8:11 AM - 2023  5:52 PM)    Mellissa Smiley [[id number]]      Labor Events    Sac identifier: Sac 1  Rupture date/time: 2023 1545  Rupture type: Artificial  Fluid color: Clear  Fluid odor: None  Labor type: Induced Onset of Labor  Labor allowed to proceed with plans for an attempted vaginal birth?: Yes  Induction: AROM, Oxytocin  Induction date/time: 2023 0800  Induction indications: Risk Reducing  Complications: None       Labor Event Times    Labor onset date/time: 2023 1023  Dilation complete date/time: 2023 163  Start pushing date/time: 2023 163       Labor Length    1st stage: 6h 15m  2nd stage: 0h 00m  3rd stage: 0h 09m       Placenta    Placenta delivery date/time: 2023  Placenta removal: Spontaneous  Placenta appearance: Intact  Placenta disposition: pathology       Cord    Vessels: 3 vessels  Complications: None  Delayed cord clamping?: Yes  Cord clamped date/time: 2023  Cord blood disposition: Lab  Gases sent?: No  Stem cell collection (by provider): No       Lacerations    Episiotomy: None  Perineal laceration: None  Other lacerations?: No  Repair suture: None       Anesthesia    Method: None  Analgesics: NITROUS OXIDE - OXYGEN THERAPY       Operative Delivery    Forceps attempted?: No  Vacuum extractor attempted?: No       Shoulder Dystocia    Shoulder dystocia present?: No       Lanagan Delivery    Time head delivered: 2023 16:38:00  Birth date/time: 2023 16:38:00  Delivery type: Vaginal, Spontaneous  Complications: None       Resuscitation    Method: None       Apgars    Living status: Living  Apgar Component Scores:  1 min.:  5 min.:  10 min.:  15 min.:  20 min.:    Skin color:  1  1       Heart rate:  2  2       Reflex  irritability:  2  2       Muscle tone:  2  2       Respiratory effort:  2  2       Total:  9  9       Apgars assigned by: HERNAN MCDONALD       Delivery Providers    Delivering clinician: FRANSISCO Hernandez   Provider Role    Crystal Schmidt, RN Delivery Nurse    Crystal Mcdonald, RN Nursery Nurse     Resident                 FRANSISCO Hernandez

## 2023-12-13 NOTE — H&P
Obstetrical Admission History and Physical     Sharlene Smiley is a 28 y.o.  at 39w2d. DAVID: 2023, by Ultrasound. Estimated fetal weight: 7.5#s. She has had prenatal care with Dr Ly and associates since 2023 .    Chief Complaint: Scheduled Induction    Assessment/Plan    Favorable cervix at term    Principal Problem:    Encounter for induction of labor  Active Problems:    PUD (peptic ulcer disease)    Gastroesophageal reflux disease    Scoliosis    History of general anesthesia      Pregnancy Problems (from 23 to present)       Problem Noted Resolved    Encounter for induction of labor 2023 by FRANSISCO Hernandez No    Priority:  Medium      Supervision of high risk pregnancy in third trimester 2023 by FRANSISCO Mancilla No    Priority:  Medium      Overview Signed 2023  2:05 PM by FRANSISCO Mancilla     Dating:   [x] Initial BMI: 31  [x] Prenatal Labs:   [x] Aneuploidy Screening:    [] Baby ASA:  [x] Anatomy US:  [x] 1hr GCT at 24-28wks: passed at 36wk  [] Tdap (27-36wks): declined  [] Flu Shot:declined  [] COVID vaccine: declined  [] Rhogam (if Rh neg):   [x] GBS at 36 wks:  [x] Breastfeeding  [x] PPBC: Mirena PPIUD   [x] 39 weeks discussion of IOL vs. Expectant management: 39wk IOL  [] Mode of delivery:           Uterine size-date discrepancy in third trimester 2023 by FRANSISCO Mancilla No    Priority:  Medium      Low weight gain in pregnancy in third trimester 2023 by FRANSISCO Mancilla No    Priority:  Medium      Overview Signed 2023  5:29 PM by FRANSISCO Mancilla     Lost 16lb this pregnancy no gain   Normal growth 32wk           Supervision of other normal pregnancy, antepartum 10/23/2023 by FRANSISCO Castano No    Priority:  Medium      Overview Addendum 10/23/2023  4:35 PM by FRANSISCO Castano       Girl  Declines Tdap due to  "Cheondoism  Breast  Hormonal IUD immediate PP   Support: mom and FOB          Decreased fetal movements in third trimester 10/23/2023 by FRANSISCO Castano No    Priority:  Medium      Overview Addendum 12/2/2023  5:13 PM by FRANSISCO Castano     Discussed reactive NST and lots of FM audible while in triage         Abnormal genetic test in pregnancy 6/9/2023 by FRANSISCO Mancilla No    Priority:  Medium      Overview Signed 11/7/2023  4:55 PM by FRANSISCO Mancilla     Formatting of this note might be different from the original. GENETIC CONSULTATION (6/16/23): \" She had cfDNA screening ([id number]) which returned screen positive for Trisomy 21/Down syndrome (result under scanned document - External document).  NT ultrasound on 06/08/2023 was WNL.\" AMNIO (7/6/23): \"NO EVIDENCE OF NUMERICAL ABNORMALITY for chromosomes 13, 18, 21, X and Y.  She does not know fetal sex (her mother does). Discussed that this is a preliminary result.  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 Will await amniocentesis full karyotype and microarray.\" [facility] RECS: \"Increased risk for Trisomy 21 on NIPT. S/p amniocentesis. Repeat anatomy and growth ultrasound at 20 weeks. Fetal echo will be needed if amnio is positive for trisomy 21. Given second trimester hematoma and positive NIPT, recommend serial third trimester growth ultrasounds.\" GENETIC COUNSELOR (8/9/23): \"Called Sharlene Smiley and left VM that amniocentesis microarray is NEGATIVE.  This completes the amniocentesis genetic testing.   False positive NIPT result for Trisomy 21 (could be due to technical error, vanishing twin or confined placental mosaicism). Due to the latter possibility, suggest growth ultrasounds in the third trimester.\"         Subchorionic hematoma in second trimester 5/29/2023 by FRANSISCO Mancilla No    Priority:  Medium      Overview Signed 11/21/2023  5:27 PM by " "FRANSISCO Mancilla     Formatting of this note might be different from the original. [facility] CONSULTATION: \"Subchorionic hematoma still present. Continues to have pelvic pain. Risks of pprom, bleeding,  labor reviewed. Requested refill of lidocaine patch. Recommend serial third trimester growth ultrasounds.\" OB ULTRASOUND (23): \"Chorion-amnion separation is again noted in the left fundal area.\"         Susceptible to varicella (non-immune), currently pregnant 2016 by FRANSISCO Mancilla No    Priority:  Medium      Overview Signed 2023  5:27 PM by FRANSISCO Mancilla     Formatting of this note might be different from the original. RECOMMEND  AVOIDING PEOPLE W/ VIRAL EXANTHEMS.               Options for delivery have been discussed with the patient and she elects for an induction of labor.  Cervical ripening with cytotec, cervidil, other prostaglandin agents has been discussed.  Induction of labor with pitocin, amniotomy, cytotec, and cervical balloon have been discussed in detail. The risks, benefits, complications, alternatives, expected outcomes, potential problems during recuperation and recovery, and the risks of not performing the procedure were discussed with the patient. The patient stated understanding that the risks of delivery include, but are not limited to: death; reaction to medications; injury to bowel, bladder, ureters, uterus, cervix, vagina, and other pelvic and abdominal structures, infection; blood loss and possible need for transfusion; and potential need for surgery, including hysterectomy. The risks of injury to the infant during delivery were also discussed. All questions were answered. There was concurrence with the planned procedure, and the patient wanted to proceed.    Admit to inpatient status. I anticipate that this patient will require a stay exceeding at least 2 midnights for delivery and postpartum.  Induction of labor.  Management " of pregnancy complications, as indicated.    Subjective   Good fetal movement. Denies vaginal bleeding., Denies contractions., Denies leaking of fluid.       Reason for Induction of Labor:  Pregnancy at 39 weeks or greater for induction     Obstetrical History   OB History    Para Term  AB Living   4 2 2   1 2   SAB IAB Ectopic Multiple Live Births   1       2      # Outcome Date GA Lbr Cory/2nd Weight Sex Delivery Anes PTL Lv   4 Current            3 Term 20 37w0d  3204 g M Vag-Spont None N JOCELYNE   2 Term 17 40w0d  3402 g M Vag-Spont None N JOCELYNE   1 SAB                Past Medical History  Past Medical History:   Diagnosis Date    Chronic UTI (urinary tract infection)     Depression     NOT CURRENTLY ON MEDS    Endometriosis     GERD (gastroesophageal reflux disease)     History of TIA (transient ischemic attack) 2017    Stress induced, PT/OT x 2 months after incident    Migraine     WITH AURA    Personal history of peptic ulcer disease     History of peptic ulcer    PTSD (post-traumatic stress disorder)         Past Surgical History   Past Surgical History:   Procedure Laterality Date    APPENDECTOMY      CARPAL TUNNEL RELEASE Left     ENDOMETRIAL ABLATION      x2    MR HEAD ANGIO WO IV CONTRAST  2019    MR HEAD ANGIO WO IV CONTRAST 2019 [facility]    MR NECK ANGIO WO IV CONTRAST  2019    MR NECK ANGIO WO IV CONTRAST [facility]    PELVIC LAPAROSCOPY  2016       Social History  Social History     Tobacco Use    Smoking status: Never     Passive exposure: Never    Smokeless tobacco: Never   Substance Use Topics    Alcohol use: Not Currently     Substance and Sexual Activity   Drug Use Never       Allergies  Pineapple, Shellfish derived, Nsaids (non-steroidal anti-inflammatory drug), Pork derived (porcine), and Topiramate     Medications  Medications Prior to Admission   Medication Sig Dispense Refill Last Dose    ergocalciferol (Vitamin D-2) 1.25  MG (64445 UT) capsule Take 1 capsule (50,000 Units) by mouth once daily.       loperamide (Imodium A-D) 2 mg tablet Take 1 tablet (2 mg) by mouth 3 times a day as needed for diarrhea. 15 tablet 0     prenatal vitamin, iron-folic, (Prenatal Vitamin) 27 mg iron-800 mcg folic acid tablet Take 1 tablet by mouth once daily.       promethazine (Phenergan) 12.5 mg tablet Take 1 tablet (12.5 mg) by mouth every 8 hours if needed for nausea or vomiting. 10 tablet 0     vortioxetine hydrobromide (TRINTELLIX ORAL) Take 25 mg by mouth once daily.          Objective    Last Vitals  Temp Pulse Resp BP MAP O2 Sat   36.8 °C (98.2 °F) 78 19 126/78   98 %     Physical Examination  GENERAL: Examination reveals a well developed, well nourished, gravid female in no acute distress. She is alert and cooperative.  HEENT: External ears normal. Nose normal, no erythema or discharge. Mouth and throat clear.  ABDOMEN: soft, gravid, nontender, nondistended, no abnormal masses, no epigastric pain  FHR is  , with Accelerations, and a Category I tracing.    Arabi reading:    The fetus is in a vertex presentation, determined by vaginal exam  Current Estimated Fetal Weight 7.5#s established by Leopold's maneuver  CERVIX: 4 cm dilated, 70 % effaced, -2 station; MEMBRANES are Intact  EXTREMITIES: no redness or tenderness in the calves or thighs, no edema  PSYCHOLOGICAL: awake and alert; oriented to person, place, and time    Lab Review  Lab Results   Component Value Date    ABO O 12/13/2023    LABRH POS 12/13/2023    ABSCRN NEG 12/13/2023     Lab Results   Component Value Date    WBC 6.0 12/13/2023    HGB 11.1 (L) 12/13/2023    HCT 34.6 (L) 12/13/2023     12/13/2023     GBS negative

## 2023-12-14 LAB
ERYTHROCYTE [DISTWIDTH] IN BLOOD BY AUTOMATED COUNT: 14.3 % (ref 11.5–14.5)
HCT VFR BLD AUTO: 36.4 % (ref 36–46)
HGB BLD-MCNC: 11.6 G/DL (ref 12–16)
MCH RBC QN AUTO: 26.5 PG (ref 26–34)
MCHC RBC AUTO-ENTMCNC: 31.9 G/DL (ref 32–36)
MCV RBC AUTO: 83 FL (ref 80–100)
NRBC BLD-RTO: 0 /100 WBCS (ref 0–0)
PLATELET # BLD AUTO: 312 X10*3/UL (ref 150–450)
RBC # BLD AUTO: 4.37 X10*6/UL (ref 4–5.2)
WBC # BLD AUTO: 8.4 X10*3/UL (ref 4.4–11.3)

## 2023-12-14 PROCEDURE — 2500000001 HC RX 250 WO HCPCS SELF ADMINISTERED DRUGS (ALT 637 FOR MEDICARE OP): Performed by: ADVANCED PRACTICE MIDWIFE

## 2023-12-14 PROCEDURE — 94760 N-INVAS EAR/PLS OXIMETRY 1: CPT

## 2023-12-14 PROCEDURE — 85027 COMPLETE CBC AUTOMATED: CPT | Performed by: NURSE PRACTITIONER

## 2023-12-14 PROCEDURE — 1100000001 HC PRIVATE ROOM DAILY

## 2023-12-14 PROCEDURE — 36415 COLL VENOUS BLD VENIPUNCTURE: CPT | Performed by: NURSE PRACTITIONER

## 2023-12-14 PROCEDURE — 2500000004 HC RX 250 GENERAL PHARMACY W/ HCPCS (ALT 636 FOR OP/ED): Performed by: ADVANCED PRACTICE MIDWIFE

## 2023-12-14 PROCEDURE — 36415 COLL VENOUS BLD VENIPUNCTURE: CPT | Performed by: OBSTETRICS & GYNECOLOGY

## 2023-12-14 PROCEDURE — 85027 COMPLETE CBC AUTOMATED: CPT | Performed by: OBSTETRICS & GYNECOLOGY

## 2023-12-14 PROCEDURE — 2500000001 HC RX 250 WO HCPCS SELF ADMINISTERED DRUGS (ALT 637 FOR MEDICARE OP): Performed by: OBSTETRICS & GYNECOLOGY

## 2023-12-14 RX ADMIN — DIPHENHYDRAMINE HYDROCHLORIDE 25 MG: 25 CAPSULE ORAL at 22:04

## 2023-12-14 RX ADMIN — VORTIOXETINE 20 MG: 10 TABLET, FILM COATED ORAL at 11:08

## 2023-12-14 RX ADMIN — DIPHENHYDRAMINE HYDROCHLORIDE 25 MG: 25 CAPSULE ORAL at 01:10

## 2023-12-14 RX ADMIN — KETOROLAC TROMETHAMINE 30 MG: 30 INJECTION, SOLUTION INTRAMUSCULAR; INTRAVENOUS at 16:15

## 2023-12-14 RX ADMIN — KETOROLAC TROMETHAMINE 30 MG: 30 INJECTION, SOLUTION INTRAMUSCULAR; INTRAVENOUS at 22:39

## 2023-12-14 RX ADMIN — KETOROLAC TROMETHAMINE 30 MG: 30 INJECTION, SOLUTION INTRAMUSCULAR; INTRAVENOUS at 09:45

## 2023-12-14 RX ADMIN — KETOROLAC TROMETHAMINE 30 MG: 30 INJECTION, SOLUTION INTRAMUSCULAR; INTRAVENOUS at 04:26

## 2023-12-14 ASSESSMENT — PAIN DESCRIPTION - DESCRIPTORS
DESCRIPTORS: ACHING
DESCRIPTORS: CRAMPING

## 2023-12-14 ASSESSMENT — PAIN SCALES - GENERAL
PAINLEVEL_OUTOF10: 4
PAINLEVEL_OUTOF10: 0 - NO PAIN
PAINLEVEL_OUTOF10: 5 - MODERATE PAIN
PAINLEVEL_OUTOF10: 3
PAINLEVEL_OUTOF10: 3
PAINLEVEL_OUTOF10: 7
PAINLEVEL_OUTOF10: 5 - MODERATE PAIN

## 2023-12-14 NOTE — PROGRESS NOTES
Postpartum Progress Note    Assessment/Plan   Sharleen Smiley is a 28 y.o., , who delivered at 39w2d gestation and is now postpartum day 1.        Principal Problem:    Encounter for induction of labor    Pregnancy Problems (from 23 to present)       Problem Noted Resolved    Encounter for induction of labor 2023 by FRANSISCO Hernandez No    Priority:  Medium      Supervision of high risk pregnancy in third trimester 2023 by FRANSISCO Mancilla No    Priority:  Medium      Overview Signed 2023  2:05 PM by FRANSISCO Mancilla     Dating:   [x] Initial BMI: 31  [x] Prenatal Labs:   [x] Aneuploidy Screening:    [] Baby ASA:  [x] Anatomy US:  [x] 1hr GCT at 24-28wks: passed at 36wk  [] Tdap (27-36wks): declined  [] Flu Shot:declined  [] COVID vaccine: declined  [] Rhogam (if Rh neg):   [x] GBS at 36 wks:  [x] Breastfeeding  [x] PPBC: Mirena PPIUD   [x] 39 weeks discussion of IOL vs. Expectant management: 39wk IOL  [] Mode of delivery:           Uterine size-date discrepancy in third trimester 2023 by FRANSISCO Mancilla No    Priority:  Medium      Low weight gain in pregnancy in third trimester 2023 by FRANSISCO Mancilla No    Priority:  Medium      Overview Signed 2023  5:29 PM by FRANSISCO Mancilla     Lost 16lb this pregnancy no gain   Normal growth 32wk           Supervision of other normal pregnancy, antepartum 10/23/2023 by FRANSISCO Castano No    Priority:  Medium      Overview Addendum 10/23/2023  4:35 PM by FRANSISCO Castano       Girl  Declines Tdap due to Restorationist  Breast  Hormonal IUD immediate PP   Support: mom and FOB          Decreased fetal movements in third trimester 10/23/2023 by FRANSISCO Castano No    Priority:  Medium      Overview Addendum 2023  5:13 PM by FRANSISCO Castano     Discussed reactive NST and lots of FM  "audible while in triage         Nausea and vomiting in pregnancy 7/31/2023 by FRANSISCO Hernandez No    Priority:  Medium      Abnormal genetic test in pregnancy 6/9/2023 by FRANSISCO Mancilla No    Priority:  Medium      Overview Signed 11/7/2023  4:55 PM by FRANSISCO Mancilla     Formatting of this note might be different from the original. GENETIC CONSULTATION (6/16/23): \" She had cfDNA screening ([id number]) which returned screen positive for Trisomy 21/Down syndrome (result under scanned document - External document).  NT ultrasound on 06/08/2023 was WNL.\" AMNIO (7/6/23): \"NO EVIDENCE OF NUMERICAL ABNORMALITY for chromosomes 13, 18, 21, X and Y.  She does not know fetal sex (her mother does). Discussed that this is a preliminary result.  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 Will await amniocentesis full karyotype and microarray.\" [facility] RECS: \"Increased risk for Trisomy 21 on NIPT. S/p amniocentesis. Repeat anatomy and growth ultrasound at 20 weeks. Fetal echo will be needed if amnio is positive for trisomy 21. Given second trimester hematoma and positive NIPT, recommend serial third trimester growth ultrasounds.\" GENETIC COUNSELOR (8/9/23): \"Called Sharlene Smiley and left VM that amniocentesis microarray is NEGATIVE.  This completes the amniocentesis genetic testing.   False positive NIPT result for Trisomy 21 (could be due to technical error, vanishing twin or confined placental mosaicism). Due to the latter possibility, suggest growth ultrasounds in the third trimester.\"         Subchorionic hematoma in second trimester 5/29/2023 by FRANSISCO Mancilla No    Priority:  Medium      Overview Signed 11/21/2023  5:27 PM by FRANSISCO Mancilla     Formatting of this note might be different from the original. [facility] CONSULTATION: \"Subchorionic hematoma still present. Continues to have pelvic pain. Risks of pprom, bleeding, " " labor reviewed. Requested refill of lidocaine patch. Recommend serial third trimester growth ultrasounds.\" OB ULTRASOUND (23): \"Chorion-amnion separation is again noted in the left fundal area.\"         Susceptible to varicella (non-immune), currently pregnant 2016 by FRANSISCO Mancilla No    Priority:  Medium      Overview Signed 2023  5:27 PM by FRANSISCO Mancilla     Formatting of this note might be different from the original. RECOMMEND  AVOIDING PEOPLE W/ VIRAL EXANTHEMS.               Hospital course: postpartum hemorrhage treated with massage, pitocin, and misoprostol  Vaginal Birth  Patient is currently breastfeedingThe patient's blood type is O POS. The baby's blood type is O NEG . Rhogam is not indicated.    Subjective   Her pain is well controlled with current medications  She is passing flatus  She is ambulating well  She is tolerating a Adult diet Regular  She reports no breast or nursing problems  She denies emotional concerns today       Objective   Allergies:   Pineapple, Shellfish derived, Nsaids (non-steroidal anti-inflammatory drug), Pork derived (porcine), and Topiramate         Last Vitals:  Temp Pulse Resp BP MAP Pulse Ox   36.7 °C (98.1 °F) 64 16 119/78   98 %     Vitals Min/Max Last 24 Hours:  Temp  Min: 35.4 °C (95.7 °F)  Max: 37.1 °C (98.8 °F)  Pulse  Min: 64  Max: 104  Resp  Min: 16  Max: 19  BP  Min: 116/75  Max: 148/93    Intake/Output:     Intake/Output Summary (Last 24 hours) at 2023 0914  Last data filed at 2023 1924  Gross per 24 hour   Intake --   Output 150 ml   Net -150 ml       Physical Exam:  General: Examination reveals a well developed, well nourished, female, in no acute distress. She is alert and cooperative.  HEENT: PERRLA. Nose normal, no erythema or discharge. Mouth and throat clear.  Lungs: clear to auscultation bilaterally.  Cardiac: regular rate and rhythm, S1, S2 normal, no murmur, click, rub or " gallop.  Fundus: firm and below umbilicus.  Extremities: no redness or tenderness in the calves or thighs, no edema.  Neurological: alert, oriented, normal speech, no focal findings or movement disorder noted.  Psychological: awake and alert; oriented to person, place, and time.    Lab Data:  Lab Results   Component Value Date    WBC 6.0 12/13/2023    HGB 11.1 (L) 12/13/2023    HCT 34.6 (L) 12/13/2023     12/13/2023

## 2023-12-14 NOTE — LACTATION NOTE
Lactation Consultant Note  Lactation Consultation  Reason for Consult: Initial assessment  Consultant Name: Tresa INFANTE    Maternal Information  Has mother  before?: Yes  How long did the mother previously breastfeed?: 14 months and 4 months  Previous Maternal Breastfeeding Challenges: Infant separation  Infant to breast within first 2 hours of birth?: Yes  Exclusive Pump and Bottle Feed: No    Maternal Assessment  Breast Assessment: Large, Symmetrical, Pendulous  Nipple Assessment: Erect  Areola Assessment: Normal    Infant Assessment  Infant Behavior: Awake  Infant Assessment:  (feeding)    Feeding Assessment  Nutrition Source: Breastmilk  Feeding Method: Nursing at the breast  Feeding Position: Cradle  Suck/Feeding: Sustained, Tactile stimulation needed  Latch Assessment: Minimal assistance is needed, Shallow latch    LATCH TOOL  Latch: Grasps breast, tongue down, lips flanged, rhythmic sucking  Audible Swallowing: A few with stimulation  Type of Nipple: Everted (After stimulation)  Comfort (Breast/Nipple): Soft/non-tender  Hold (Positioning): Minimal assist, teach one side, mother does other, staff holds  LATCH Score: 8    Breast Pump       Other OB Lactation Tools       Patient Follow-up  Inpatient Lactation Follow-up Needed : Yes    Other OB Lactation Documentation       Recommendations/Summary  Observed baby on the left side. Mom had baby positioned well. However, the latch appeared to be bit shallow. Baby's lower jaw was gently pulled downward to attempt to widen gape. Mom reported the latch as comfortable. Mom reports a very good milk supply with her last two children. She only breast fed her last child for two months because baby needed surgery. Mom then pumped for 2 more months. Mom is confident regarding breast feeding. We discussed focusing on latching deeply and massaging breast during feed to stimulate baby and to promote milk flow. Breastfeeding education reviewed and questions answered. Mom  aware of lactation support and asked to call out for feed assistance and with questions as needed.

## 2023-12-15 VITALS
RESPIRATION RATE: 16 BRPM | BODY MASS INDEX: 31.49 KG/M2 | SYSTOLIC BLOOD PRESSURE: 128 MMHG | WEIGHT: 200.62 LBS | OXYGEN SATURATION: 99 % | HEART RATE: 75 BPM | HEIGHT: 67 IN | TEMPERATURE: 97.9 F | DIASTOLIC BLOOD PRESSURE: 87 MMHG

## 2023-12-15 PROBLEM — Z34.90 ENCOUNTER FOR INDUCTION OF LABOR (HHS-HCC): Status: RESOLVED | Noted: 2023-12-13 | Resolved: 2023-12-15

## 2023-12-15 PROBLEM — Z34.80 SUPERVISION OF OTHER NORMAL PREGNANCY, ANTEPARTUM (HHS-HCC): Status: RESOLVED | Noted: 2023-10-23 | Resolved: 2023-12-15

## 2023-12-15 PROBLEM — Z86.73 HISTORY OF TIA (TRANSIENT ISCHEMIC ATTACK): Status: RESOLVED | Noted: 2017-01-01 | Resolved: 2023-12-15

## 2023-12-15 LAB
ALBUMIN SERPL BCP-MCNC: 2.9 G/DL (ref 3.4–5)
ALP SERPL-CCNC: 93 U/L (ref 33–110)
ALT SERPL W P-5'-P-CCNC: 8 U/L (ref 7–45)
ANION GAP SERPL CALC-SCNC: 14 MMOL/L (ref 10–20)
AST SERPL W P-5'-P-CCNC: 14 U/L (ref 9–39)
BILIRUB SERPL-MCNC: 0.2 MG/DL (ref 0–1.2)
BUN SERPL-MCNC: 7 MG/DL (ref 6–23)
CALCIUM SERPL-MCNC: 8.4 MG/DL (ref 8.6–10.3)
CHLORIDE SERPL-SCNC: 106 MMOL/L (ref 98–107)
CO2 SERPL-SCNC: 19 MMOL/L (ref 21–32)
CREAT SERPL-MCNC: 0.55 MG/DL (ref 0.5–1.05)
ERYTHROCYTE [DISTWIDTH] IN BLOOD BY AUTOMATED COUNT: 14.2 % (ref 11.5–14.5)
ERYTHROCYTE [DISTWIDTH] IN BLOOD BY AUTOMATED COUNT: 14.4 % (ref 11.5–14.5)
GFR SERPL CREATININE-BSD FRML MDRD: >90 ML/MIN/1.73M*2
GLUCOSE SERPL-MCNC: 117 MG/DL (ref 74–99)
HCT VFR BLD AUTO: 34.5 % (ref 36–46)
HCT VFR BLD AUTO: 35.9 % (ref 36–46)
HGB BLD-MCNC: 11.3 G/DL (ref 12–16)
HGB BLD-MCNC: 11.5 G/DL (ref 12–16)
HOLD SPECIMEN: NORMAL
HOLD SPECIMEN: NORMAL
LDH SERPL L TO P-CCNC: 232 U/L (ref 84–246)
MCH RBC QN AUTO: 26.5 PG (ref 26–34)
MCH RBC QN AUTO: 27.4 PG (ref 26–34)
MCHC RBC AUTO-ENTMCNC: 31.5 G/DL (ref 32–36)
MCHC RBC AUTO-ENTMCNC: 33.3 G/DL (ref 32–36)
MCV RBC AUTO: 82 FL (ref 80–100)
MCV RBC AUTO: 84 FL (ref 80–100)
NRBC BLD-RTO: 0 /100 WBCS (ref 0–0)
NRBC BLD-RTO: 0 /100 WBCS (ref 0–0)
PLATELET # BLD AUTO: 296 X10*3/UL (ref 150–450)
PLATELET # BLD AUTO: 316 X10*3/UL (ref 150–450)
POTASSIUM SERPL-SCNC: 3.7 MMOL/L (ref 3.5–5.3)
PROT SERPL-MCNC: 5.3 G/DL (ref 6.4–8.2)
RBC # BLD AUTO: 4.2 X10*6/UL (ref 4–5.2)
RBC # BLD AUTO: 4.26 X10*6/UL (ref 4–5.2)
SODIUM SERPL-SCNC: 135 MMOL/L (ref 136–145)
WBC # BLD AUTO: 7.4 X10*3/UL (ref 4.4–11.3)
WBC # BLD AUTO: 7.8 X10*3/UL (ref 4.4–11.3)

## 2023-12-15 PROCEDURE — 2500000004 HC RX 250 GENERAL PHARMACY W/ HCPCS (ALT 636 FOR OP/ED): Performed by: ADVANCED PRACTICE MIDWIFE

## 2023-12-15 PROCEDURE — 80053 COMPREHEN METABOLIC PANEL: CPT | Performed by: NURSE PRACTITIONER

## 2023-12-15 PROCEDURE — 2500000001 HC RX 250 WO HCPCS SELF ADMINISTERED DRUGS (ALT 637 FOR MEDICARE OP): Performed by: OBSTETRICS & GYNECOLOGY

## 2023-12-15 PROCEDURE — 2500000001 HC RX 250 WO HCPCS SELF ADMINISTERED DRUGS (ALT 637 FOR MEDICARE OP): Performed by: NURSE PRACTITIONER

## 2023-12-15 PROCEDURE — 36415 COLL VENOUS BLD VENIPUNCTURE: CPT | Performed by: NURSE PRACTITIONER

## 2023-12-15 PROCEDURE — 85027 COMPLETE CBC AUTOMATED: CPT | Performed by: NURSE PRACTITIONER

## 2023-12-15 PROCEDURE — 83615 LACTATE (LD) (LDH) ENZYME: CPT | Performed by: NURSE PRACTITIONER

## 2023-12-15 RX ORDER — OXYCODONE HYDROCHLORIDE 5 MG/1
5 TABLET ORAL EVERY 6 HOURS PRN
Status: DISCONTINUED | OUTPATIENT
Start: 2023-12-15 | End: 2023-12-15 | Stop reason: HOSPADM

## 2023-12-15 RX ORDER — CYCLOBENZAPRINE HCL 5 MG
5 TABLET ORAL ONCE
Status: COMPLETED | OUTPATIENT
Start: 2023-12-15 | End: 2023-12-15

## 2023-12-15 RX ADMIN — OXYCODONE HYDROCHLORIDE 5 MG: 5 TABLET ORAL at 10:25

## 2023-12-15 RX ADMIN — VORTIOXETINE 20 MG: 10 TABLET, FILM COATED ORAL at 08:28

## 2023-12-15 RX ADMIN — CYCLOBENZAPRINE HYDROCHLORIDE 5 MG: 5 TABLET, FILM COATED ORAL at 10:25

## 2023-12-15 RX ADMIN — KETOROLAC TROMETHAMINE 30 MG: 30 INJECTION, SOLUTION INTRAMUSCULAR; INTRAVENOUS at 05:11

## 2023-12-15 ASSESSMENT — PAIN SCALES - GENERAL
PAINLEVEL_OUTOF10: 6
PAINLEVEL_OUTOF10: 0 - NO PAIN
PAINLEVEL_OUTOF10: 7

## 2023-12-15 NOTE — DISCHARGE INSTR - AVS FIRST PAGE
Any woman can have complications after the birth of a baby including a blood clot, a heart problem, hypertensive disorder/eclampsia, depression, hemorrhage, or infection. Notify all providers of your delivery date up to one year after birth.*       Call 911 or go to nearest emergency room right away if you have: PAIN or pressure in chest; OBSTRUCTED breathing or shortness of breath; SEIZURES; THOUGHTS of hurting yourself or your baby; heart palpitations/racing; change in alertness/confusion.    Call your provider if you have: BLEEDING, soaking through a pad/hour, or blood clots the size of an egg or bigger; INCISION (episiotomy stitches or  site) that is not healing (increased redness, pain, drainage/pus, or separation); RED or swollen leg/calf that is painful or warm to touch, especially in one leg more than the other; TEMPERATURE of 100.4 F or higher or chills; HEADACHE that does not get better with medicine, rest or hydration, or bad headache with vision changes like spots or flashing lights; increased swelling of face, hands or legs; severe cramps or upper right belly pain; red or swollen breast that is painful or warm to touch; an unusual, foul odor from your vaginal discharge; pain, burning, or difficulty during urination; severe constipation (more than 5 days); feelings of depression (such as depressed mood, loss of interest in enjoyable things, unable to care for yourself, trouble sleeping, lack of appetite, or feeling worthless).     If you can’t reach your provider or symptoms worsen, call 911 or go to nearest emergency room.   *Information obtained from [facility]: Save Your Life: Get Care for These POST-BIRTH Warning Signs    “The American Academy of Pediatrics recommends that pacifier use is best avoided during the initiation of breastfeeding and used only after breastfeeding is well established.  In some infants, early pacifier use may interfere with establishment of good breastfeeding  practices, whereas in others it may indicate the presence of a breastfeeding problem that requires intervention.  Use of a pacifier may cause problems with latching, and lead to decreased milk supply by missing feeding opportunities.  Pacifiers may be used during painful procedures, but are not otherwise recommended while the infant is learning to breastfeed.”

## 2023-12-15 NOTE — CARE PLAN
Problem: Vaginal Birth or  Section  Goal: Fetal and maternal status remain reassuring during the birth process  Outcome: Met  Goal: Tolerate CRB for IOL placement maintenance until dislodgement/removal 12hrs after placement  Outcome: Met  Goal: Prevention of malpresentation/labor dystocia through delivery  Outcome: Met  Goal: Demonstrates labor coping techniques through delivery  Outcome: Met  Goal: Minimal s/sx of HDP and BP<160/110  Outcome: Met  Goal: No s/sx of infection through recovery  Outcome: Met  Goal: No s/sx of hemorrhage through recovery  Outcome: Met     Problem: Skin  Goal: Decreased wound size/increased tissue granulation at next dressing change  Outcome: Met  Goal: Participates in plan/prevention/treatment measures  Outcome: Met  Goal: Prevent/manage excess moisture  Outcome: Met  Goal: Prevent/minimize sheer/friction injuries  Outcome: Met  Goal: Promote/optimize nutrition  Outcome: Met  Goal: Promote skin healing  Outcome: Met   The patient's goals for the shift include BP control    The clinical goals for the shift include pain and BP control    Over the shift, the patient met the following goals. Barriers to progression include n/a. Recommendations to address these barriers include n/a.    Patient met all goals. Discharge instructions reviewed. Patient to be discharged.

## 2023-12-15 NOTE — PROGRESS NOTES
Postpartum Progress Note    Assessment/Plan   Sharlene Smiley is a 28 y.o., , who delivered at 39w2d gestation and is now postpartum day 2. Occasional mild range BP PP. Labs normal. No sx.    Still having back pain. Sciatica throughout pregnancy. Cramping only with nursing.  Baby latching well but peds is concerned regarding drop in weight after delivery.    Will order flexeril for back pain  Will reevaluate back pain later today  Anticipate discharge home later today  Will follow up with Dr Allen next week for BP check    Update  Back pain improved with flexeril  BP normal range  Discharge home, follow up next week for BP check    Principal Problem:    Encounter for induction of labor  Active Problems:    Migraine    History of TIA (transient ischemic attack)    Supervision of other normal pregnancy, antepartum    Pregnancy Problems (from 23 to present)       Problem Noted Resolved    Encounter for induction of labor 2023 by FRANSISCO Hernandez No    Priority:  Medium      Supervision of high risk pregnancy in third trimester 2023 by FRANSISCO Mancilla No    Priority:  Medium      Overview Signed 2023  2:05 PM by FRANSISCO Mancilla     Dating:   [x] Initial BMI: 31  [x] Prenatal Labs:   [x] Aneuploidy Screening:    [] Baby ASA:  [x] Anatomy US:  [x] 1hr GCT at 24-28wks: passed at 36wk  [] Tdap (27-36wks): declined  [] Flu Shot:declined  [] COVID vaccine: declined  [] Rhogam (if Rh neg):   [x] GBS at 36 wks:  [x] Breastfeeding  [x] PPBC: Mirena PPIUD   [x] 39 weeks discussion of IOL vs. Expectant management: 39wk IOL  [] Mode of delivery:           Uterine size-date discrepancy in third trimester 2023 by FRANSISCO Mancilla No    Priority:  Medium      Low weight gain in pregnancy in third trimester 2023 by FRANSISCO Mancilla No    Priority:  Medium      Overview Signed 2023  5:29 PM by Millicent Bland  "FRANSISCO     Lost 16lb this pregnancy no gain   Normal growth 32wk           Supervision of other normal pregnancy, antepartum 10/23/2023 by FRANSISCO Castano No    Priority:  Medium      Overview Addendum 10/23/2023  4:35 PM by FRANSISCO Castano       Girl  Declines Tdap due to Jewish  Breast  Hormonal IUD immediate PP   Support: mom and FOB          Decreased fetal movements in third trimester 10/23/2023 by FRANSISCO Castano No    Priority:  Medium      Overview Addendum 12/2/2023  5:13 PM by FRANSISCO Castano     Discussed reactive NST and lots of FM audible while in triage         Nausea and vomiting in pregnancy 7/31/2023 by FRANSISCO Hernandez No    Priority:  Medium      Abnormal genetic test in pregnancy 6/9/2023 by FRANSISCO Mancilla No    Priority:  Medium      Overview Signed 11/7/2023  4:55 PM by FRANSISCO Mancilla     Formatting of this note might be different from the original. GENETIC CONSULTATION (6/16/23): \" She had cfDNA screening ([id number]) which returned screen positive for Trisomy 21/Down syndrome (result under scanned document - External document).  NT ultrasound on 06/08/2023 was WNL.\" AMNIO (7/6/23): \"NO EVIDENCE OF NUMERICAL ABNORMALITY for chromosomes 13, 18, 21, X and Y.  She does not know fetal sex (her mother does). Discussed that this is a preliminary result.  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 Will await amniocentesis full karyotype and microarray.\" [facility] RECS: \"Increased risk for Trisomy 21 on NIPT. S/p amniocentesis. Repeat anatomy and growth ultrasound at 20 weeks. Fetal echo will be needed if amnio is positive for trisomy 21. Given second trimester hematoma and positive NIPT, recommend serial third trimester growth ultrasounds.\" GENETIC COUNSELOR (8/9/23): \"Called Sharlene Smiley and left VM that amniocentesis microarray is NEGATIVE.  This " "completes the amniocentesis genetic testing.   False positive NIPT result for Trisomy 21 (could be due to technical error, vanishing twin or confined placental mosaicism). Due to the latter possibility, suggest growth ultrasounds in the third trimester.\"         Subchorionic hematoma in second trimester 2023 by FRANSISCO Mancilla No    Priority:  Medium      Overview Signed 2023  5:27 PM by FRANSISCO Mancilla     Formatting of this note might be different from the original. M CONSULTATION: \"Subchorionic hematoma still present. Continues to have pelvic pain. Risks of pprom, bleeding,  labor reviewed. Requested refill of lidocaine patch. Recommend serial third trimester growth ultrasounds.\" OB ULTRASOUND (23): \"Chorion-amnion separation is again noted in the left fundal area.\"         Susceptible to varicella (non-immune), currently pregnant 2016 by FRANSISCO Mancilla No    Priority:  Medium      Overview Signed 2023  5:27 PM by FRANSISCO Mancilla     Formatting of this note might be different from the original. RECOMMEND  AVOIDING PEOPLE W/ VIRAL EXANTHEMS.               Hospital course: gestational hypertension  Vaginal Birth  Patient is currently breastfeedingThe patient's blood type is O POS. The baby's blood type is O NEG . Rhogam is not indicated.    Subjective   Her pain is moderately controlled with current medications  She is passing flatus  She is ambulating well  She is tolerating a Adult diet Regular  She reports no breast or nursing problems  She denies emotional concerns today   Her plan for contraception is uncertain         Objective   Allergies:   Pineapple, Shellfish derived, Nsaids (non-steroidal anti-inflammatory drug), Pork derived (porcine), and Topiramate         Last Vitals:  Temp Pulse Resp BP MAP Pulse Ox   36.8 °C (98.3 °F) 80 16 128/83   99 %     Vitals Min/Max Last 24 Hours:  Temp  Min: 36.8 °C (98.2 °F)  " Max: 37.1 °C (98.8 °F)  Pulse  Min: 72  Max: 88  Resp  Min: 16  Max: 18  BP  Min: 124/78  Max: 140/89    Intake/Output:   No intake or output data in the 24 hours ending 12/15/23 1018    Physical Exam:  General: Examination reveals a well developed, well nourished, female, in no acute distress and whose affect is appropriate. She is alert and cooperative.  Abdomen: soft, gravid, nontender, nondistended, no abnormal masses, no epigastric pain,  .  Fundus: below umbilicus and nontender.  Extremities: no redness or tenderness in the calves or thighs, no edema.  Back: NT    Lab Data:  Labs in chart were reviewed.

## 2023-12-15 NOTE — DISCHARGE SUMMARY
Discharge Summary    Admission Date: 2023  Discharge Date: 12/15/2023    Discharge Diagnosis  Encounter for induction of labor    Hospital Course  Delivery Date: 2023  4:38 PM   Delivery type: Vaginal, Spontaneous    GA at delivery: 39w2d  Outcome: Living   Anesthesia during delivery: None   Intrapartum complications: None   Feeding method: Breastfeeding Status: Yes     Procedures:    Contraception at discharge: none      Pertinent Physical Exam At Time of Discharge        Discharge Meds     Your medication list        START taking these medications        Instructions Last Dose Given Next Dose Due   acetaminophen 500 mg tablet  Commonly known as: Tylenol Extra Strength      Take 2 tablets (1,000 mg) by mouth every 6 hours if needed for mild pain (1 - 3).       ibuprofen 600 mg tablet      Take 1 tablet (600 mg) by mouth every 6 hours if needed for mild pain (1 - 3) or moderate pain (4 - 6).       sucralfate 1 gram tablet  Commonly known as: Carafate      Take 1 tablet (1 g) by mouth 4 times a day before meals.              CHANGE how you take these medications        Instructions Last Dose Given Next Dose Due   vortioxetine 20 mg tablet tablet  Commonly known as: Trintellix  What changed:   medication strength  how much to take      Take 1 tablet (20 mg) by mouth once daily.              CONTINUE taking these medications        Instructions Last Dose Given Next Dose Due   ergocalciferol 1.25 MG (84711 UT) capsule  Commonly known as: Vitamin D-2           Prenatal Vitamin 27 mg iron-800 mcg folic acid tablet  Generic drug: prenatal vitamin (iron-folic)                  STOP taking these medications      promethazine 12.5 mg tablet  Commonly known as: Phenergan                  Where to Get Your Medications        These medications were sent to [facility] - [place], [place] - [address]  [address], [facility] [address]      Phone: [phone]   acetaminophen 500 mg  tablet  ibuprofen 600 mg tablet  sucralfate 1 gram tablet  vortioxetine 20 mg tablet tablet          Complications Requiring Follow-Up  Mild range BP    Test Results Pending At Discharge  Pending Labs       Order Current Status    CBC Collected (12/14/23 2300)    Comprehensive metabolic panel Collected (12/14/23 2300)    Lactate dehydrogenase Collected (12/14/23 2300)    Surgical Pathology Exam - PLACENTA In process            Outpatient Follow-Up  No future appointments.    I spent 50 minutes in the professional and overall care of this patient.      Lindsay Rodrigez MD

## 2023-12-15 NOTE — LACTATION NOTE
Lactation Consultant Note  Lactation Consultation  Reason for Consult: Follow-up assessment  Consultant Name: LUIS Keith    Maternal Information  Has mother  before?: Yes    Maternal Assessment  Breast Assessment: Large, Filling, Compressible  Nipple Assessment: Intact, Sore  Areola Assessment: Normal    Infant Assessment  Infant Behavior: Awake  Infant Assessment: Good cupping of tongue    Feeding Assessment  Nutrition Source: Breastmilk  Feeding Method: Nursing at the breast  Feeding Position: Baby led, Cradle, Mother demonstrates good positioning  Suck/Feeding: Sustained, Baby led rhythmically, Coordinated suck/swallow/breathe, Audible swallowing  Latch Assessment: Eagerly grasped on to latch, Areolar attachment, Wide open mouth < 160, Sucks with long jaw movement, Frequent audible swallows    LATCH TOOL  Latch: Grasps breast, tongue down, lips flanged, rhythmic sucking  Audible Swallowing: Spontaneous and intermittent (24 hours old)  Type of Nipple: Everted (After stimulation)  Comfort (Breast/Nipple): Soft/non-tender  Hold (Positioning): No assist from staff, mother able to position/hold infant  LATCH Score: 10    Breast Pump       Other OB Lactation Tools       Patient Follow-up  Inpatient Lactation Follow-up Needed : No  Lactation Professional - OK to Discharge: Yes    Other OB Lactation Documentation       Recommendations/Summary  Notified by RN that infant is down 8% on DOL 2. Infant has been feeding consistently, voiding and stooling appropriately, and the weight change from yesterday to today was only 50g. Infant noted to have a weight discrepancy between the l&d floor and post partum unit of over 2% as well. Mother is experienced and is noticing appropriate changes in her breasts for DOL 2. She verbalized understanding of offering both sides every feed and aiming for at least 8 feeds about 20-30min long. She denies further needs at this time.

## 2023-12-15 NOTE — PROGRESS NOTES
Postpartum Progress Note    Assessment/Plan   Sharlene Smiley is a 28 y.o., , who delivered at 39w2d gestation and is now postpartum day 1.      A: gHTN, now PP    P: R/O PEC  HELLP labs obtained, pending   Discussed pain management, due for Toradol, will reassess to follow and consider oxy 5 for back pain  Dr. Rodrigez updated on pt status    Isa Collier, FRANSISCO, JUAN-CNP     Principal Problem:    Encounter for induction of labor    Pregnancy Problems (from 23 to present)       Problem Noted Resolved    Encounter for induction of labor 2023 by FRANSISCO Hernandez No    Priority:  Medium      Supervision of high risk pregnancy in third trimester 2023 by FRANSISCO Mancilla No    Priority:  Medium      Overview Signed 2023  2:05 PM by FRANSISCO Mancilla     Dating:   [x] Initial BMI: 31  [x] Prenatal Labs:   [x] Aneuploidy Screening:    [] Baby ASA:  [x] Anatomy US:  [x] 1hr GCT at 24-28wks: passed at 36wk  [] Tdap (27-36wks): declined  [] Flu Shot:declined  [] COVID vaccine: declined  [] Rhogam (if Rh neg):   [x] GBS at 36 wks:  [x] Breastfeeding  [x] PPBC: Mirena PPIUD   [x] 39 weeks discussion of IOL vs. Expectant management: 39wk IOL  [] Mode of delivery:           Uterine size-date discrepancy in third trimester 2023 by FRANSISCO Mancilla No    Priority:  Medium      Low weight gain in pregnancy in third trimester 2023 by FRANSISCO Mancilla No    Priority:  Medium      Overview Signed 2023  5:29 PM by FRANSISCO Mancilla     Lost 16lb this pregnancy no gain   Normal growth 32wk           Supervision of other normal pregnancy, antepartum 10/23/2023 by FRANSISCO Castano No    Priority:  Medium      Overview Addendum 10/23/2023  4:35 PM by Sindhu Steer-Shamar, APRN-CNM       Girl  Declines Tdap due to Ep[employer]opalian  Breast  Hormonal IUD immediate PP   Support: mom and FOB           "Decreased fetal movements in third trimester 10/23/2023 by FRANSISCO Castano No    Priority:  Medium      Overview Addendum 12/2/2023  5:13 PM by FRANSISCO Castano     Discussed reactive NST and lots of FM audible while in triage         Nausea and vomiting in pregnancy 7/31/2023 by FRANSISCO Hernandez No    Priority:  Medium      Abnormal genetic test in pregnancy 6/9/2023 by FRANSISCO Mancilla No    Priority:  Medium      Overview Signed 11/7/2023  4:55 PM by FRANSISCO Mancilla     Formatting of this note might be different from the original. GENETIC CONSULTATION (6/16/23): \" She had cfDNA screening ([id number]) which returned screen positive for Trisomy 21/Down syndrome (result under scanned document - External document).  NT ultrasound on 06/08/2023 was WNL.\" AMNIO (7/6/23): \"NO EVIDENCE OF NUMERICAL ABNORMALITY for chromosomes 13, 18, 21, X and Y.  She does not know fetal sex (her mother does). Discussed that this is a preliminary result.  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 Will await amniocentesis full karyotype and microarray.\" [facility] RECS: \"Increased risk for Trisomy 21 on NIPT. S/p amniocentesis. Repeat anatomy and growth ultrasound at 20 weeks. Fetal echo will be needed if amnio is positive for trisomy 21. Given second trimester hematoma and positive NIPT, recommend serial third trimester growth ultrasounds.\" GENETIC COUNSELOR (8/9/23): \"Called Sharlene Smiley and left VM that amniocentesis microarray is NEGATIVE.  This completes the amniocentesis genetic testing.   False positive NIPT result for Trisomy 21 (could be due to technical error, vanishing twin or confined placental mosaicism). Due to the latter possibility, suggest growth ultrasounds in the third trimester.\"         Subchorionic hematoma in second trimester 5/29/2023 by FRANSISCO Mancilla No    Priority:  Medium      Overview Signed " "2023  5:27 PM by FRANSISCO Mancilla     Formatting of this note might be different from the original. M CONSULTATION: \"Subchorionic hematoma still present. Continues to have pelvic pain. Risks of pprom, bleeding,  labor reviewed. Requested refill of lidocaine patch. Recommend serial third trimester growth ultrasounds.\" OB ULTRASOUND (23): \"Chorion-amnion separation is again noted in the left fundal area.\"         Susceptible to varicella (non-immune), currently pregnant 2016 by FRANSISCO Mancilla No    Priority:  Medium      Overview Signed 2023  5:27 PM by FRANSISCO Mancilla     Formatting of this note might be different from the original. RECOMMEND  AVOIDING PEOPLE W/ VIRAL EXANTHEMS.               Hospital course: no complications  Vaginal Birth  Patient is currently breastfeedingThe patient's blood type is O POS. The baby's blood type is O NEG . Rhogam is not indicated.    Subjective   Her pain is well controlled with current medications  She is passing flatus  She is ambulating well  She is tolerating a Adult diet Regular  She reports no breast or nursing problems  She denies emotional concerns today        New onset of elevated blood pressures PP. Pt states she had random elevated blood pressures in pregnancy.  States she has a history of migraines and states she is seeing auras, denies blurred vision, denies RUQ pain.     Continued back pain associated with sciatica per pt and cramping with nursing.       Objective   Allergies:   Pineapple, Shellfish derived, Nsaids (non-steroidal anti-inflammatory drug), Pork derived (porcine), and Topiramate         Last Vitals:  Temp Pulse Resp BP MAP Pulse Ox   36.8 °C (98.2 °F) 81 18 (!) 140/89 (RN notified)   97 %     Vitals Min/Max Last 24 Hours:  Temp  Min: 36.5 °C (97.7 °F)  Max: 37.1 °C (98.8 °F)  Pulse  Min: 64  Max: 88  Resp  Min: 16  Max: 18  BP  Min: 116/75  Max: 140/89    Intake/Output:   No " intake or output data in the 24 hours ending 12/14/23 0764    Physical Exam:  General: Examination reveals a well developed, well nourished, female, in no acute distress. She is alert and cooperative.  Lungs: clear to auscultation bilaterally.  Cardiac: regular rate and rhythm, S1, S2 normal, no murmur, click, rub or gallop.  Abdomen: soft, gravid, nontender, nondistended, no abnormal masses, no epigastric pain.  Fundus: firm.  Extremities: no redness or tenderness in the calves or thighs, no edema.    Lab Data:  Lab Results   Component Value Date    ABO O 12/13/2023    LABRH POS 12/13/2023    ABSCRN NEG 12/13/2023     Lab Results   Component Value Date    WBC 8.4 12/14/2023    HGB 11.6 (L) 12/14/2023    HCT 36.4 12/14/2023     12/14/2023

## 2023-12-18 LAB
LABORATORY COMMENT REPORT: NORMAL
PATH REPORT.COMMENTS IMP SPEC: NORMAL
PATH REPORT.FINAL DX SPEC: NORMAL
PATH REPORT.GROSS SPEC: NORMAL
PATH REPORT.RELEVANT HX SPEC: NORMAL
PATH REPORT.TOTAL CANCER: NORMAL

## 2024-01-15 ENCOUNTER — PHARMACY VISIT (OUTPATIENT)
Dept: PHARMACY | Facility: CLINIC | Age: 29
End: 2024-01-15
Payer: MEDICAID

## 2024-01-15 PROCEDURE — RXMED WILLOW AMBULATORY MEDICATION CHARGE

## 2024-01-15 RX ORDER — FLUCONAZOLE 100 MG/1
TABLET ORAL
Qty: 32 TABLET | Refills: 0 | OUTPATIENT
Start: 2024-01-12 | End: 2024-01-30

## 2024-01-30 DIAGNOSIS — R52 POSTPARTUM PAIN (HHS-HCC): ICD-10-CM

## 2024-01-31 DIAGNOSIS — K27.9 PUD (PEPTIC ULCER DISEASE): ICD-10-CM

## 2024-01-31 RX ORDER — IBUPROFEN 600 MG/1
600 TABLET ORAL EVERY 6 HOURS PRN
Qty: 120 TABLET | Refills: 0 | Status: SHIPPED | OUTPATIENT
Start: 2024-01-31 | End: 2024-02-21

## 2024-02-01 RX ORDER — SUCRALFATE 1 G/1
1 TABLET ORAL
Qty: 90 TABLET | Refills: 1 | Status: SHIPPED | OUTPATIENT
Start: 2024-02-01

## 2024-02-20 DIAGNOSIS — R52 POSTPARTUM PAIN (HHS-HCC): ICD-10-CM

## 2024-02-21 RX ORDER — IBUPROFEN 600 MG/1
600 TABLET ORAL EVERY 6 HOURS PRN
Qty: 120 TABLET | Refills: 0 | Status: SHIPPED | OUTPATIENT
Start: 2024-02-21 | End: 2024-03-08

## 2024-03-06 DIAGNOSIS — R52 POSTPARTUM PAIN (HHS-HCC): ICD-10-CM

## 2024-03-08 RX ORDER — IBUPROFEN 600 MG/1
600 TABLET ORAL EVERY 6 HOURS PRN
Qty: 120 TABLET | Refills: 0 | Status: SHIPPED | OUTPATIENT
Start: 2024-03-08 | End: 2024-05-02 | Stop reason: SDUPTHER

## 2024-04-29 DIAGNOSIS — R52 POSTPARTUM PAIN (HHS-HCC): ICD-10-CM

## 2024-05-02 DIAGNOSIS — R52 POSTPARTUM PAIN (HHS-HCC): ICD-10-CM

## 2024-05-02 RX ORDER — IBUPROFEN 600 MG/1
600 TABLET ORAL EVERY 6 HOURS PRN
Qty: 120 TABLET | Refills: 0 | Status: SHIPPED | OUTPATIENT
Start: 2024-05-02

## 2024-05-02 RX ORDER — IBUPROFEN 600 MG/1
600 TABLET ORAL EVERY 6 HOURS PRN
Qty: 120 TABLET | Refills: 3 | Status: SHIPPED | OUTPATIENT
Start: 2024-05-02

## 2024-06-06 ENCOUNTER — APPOINTMENT (OUTPATIENT)
Dept: OBSTETRICS AND GYNECOLOGY | Facility: CLINIC | Age: 29
End: 2024-06-06
Payer: COMMERCIAL

## 2024-07-11 DIAGNOSIS — F43.10 PTSD (POST-TRAUMATIC STRESS DISORDER): ICD-10-CM

## 2024-07-11 RX ORDER — VORTIOXETINE 20 MG/1
20 TABLET, FILM COATED ORAL DAILY
Qty: 30 TABLET | Refills: 1 | Status: SHIPPED | OUTPATIENT
Start: 2024-07-11

## 2025-02-07 PROCEDURE — 99285 EMERGENCY DEPT VISIT HI MDM: CPT

## 2025-02-07 ASSESSMENT — PAIN DESCRIPTION - LOCATION: LOCATION: VAGINA

## 2025-02-07 ASSESSMENT — PAIN SCALES - GENERAL: PAINLEVEL_OUTOF10: 9

## 2025-02-07 ASSESSMENT — PAIN DESCRIPTION - PROGRESSION: CLINICAL_PROGRESSION: NOT CHANGED

## 2025-02-07 ASSESSMENT — PAIN - FUNCTIONAL ASSESSMENT: PAIN_FUNCTIONAL_ASSESSMENT: 0-10

## 2025-02-07 ASSESSMENT — PAIN DESCRIPTION - PAIN TYPE: TYPE: ACUTE PAIN

## 2025-02-08 ENCOUNTER — APPOINTMENT (OUTPATIENT)
Dept: RADIOLOGY | Facility: HOSPITAL | Age: 30
End: 2025-02-08
Payer: COMMERCIAL

## 2025-02-08 ENCOUNTER — HOSPITAL ENCOUNTER (EMERGENCY)
Facility: HOSPITAL | Age: 30
Discharge: HOME | End: 2025-02-08
Payer: COMMERCIAL

## 2025-02-08 VITALS
SYSTOLIC BLOOD PRESSURE: 120 MMHG | HEART RATE: 85 BPM | OXYGEN SATURATION: 99 % | HEIGHT: 67 IN | DIASTOLIC BLOOD PRESSURE: 75 MMHG | WEIGHT: 175 LBS | BODY MASS INDEX: 27.47 KG/M2 | RESPIRATION RATE: 16 BRPM | TEMPERATURE: 97.4 F

## 2025-02-08 DIAGNOSIS — N30.90 CYSTITIS: ICD-10-CM

## 2025-02-08 DIAGNOSIS — N81.4 UTERINE PROLAPSE: Primary | ICD-10-CM

## 2025-02-08 LAB
ALBUMIN SERPL BCP-MCNC: 4.2 G/DL (ref 3.4–5)
ALP SERPL-CCNC: 59 U/L (ref 33–110)
ALT SERPL W P-5'-P-CCNC: 9 U/L (ref 7–45)
ANION GAP SERPL CALC-SCNC: 9 MMOL/L (ref 10–20)
APPEARANCE UR: CLEAR
AST SERPL W P-5'-P-CCNC: 11 U/L (ref 9–39)
B-HCG SERPL-ACNC: <2 MIU/ML
BACTERIA #/AREA URNS AUTO: ABNORMAL /HPF
BASOPHILS # BLD AUTO: 0.04 X10*3/UL (ref 0–0.1)
BASOPHILS NFR BLD AUTO: 0.6 %
BILIRUB SERPL-MCNC: 0.4 MG/DL (ref 0–1.2)
BILIRUB UR STRIP.AUTO-MCNC: NEGATIVE MG/DL
BUN SERPL-MCNC: 6 MG/DL (ref 6–23)
CALCIUM SERPL-MCNC: 8.7 MG/DL (ref 8.6–10.3)
CHLORIDE SERPL-SCNC: 106 MMOL/L (ref 98–107)
CLUE CELLS SPEC QL WET PREP: NORMAL
CO2 SERPL-SCNC: 28 MMOL/L (ref 21–32)
COLOR UR: YELLOW
CREAT SERPL-MCNC: 0.78 MG/DL (ref 0.5–1.05)
EGFRCR SERPLBLD CKD-EPI 2021: >90 ML/MIN/1.73M*2
EOSINOPHIL # BLD AUTO: 0.2 X10*3/UL (ref 0–0.7)
EOSINOPHIL NFR BLD AUTO: 2.9 %
ERYTHROCYTE [DISTWIDTH] IN BLOOD BY AUTOMATED COUNT: 14.4 % (ref 11.5–14.5)
GLUCOSE SERPL-MCNC: 102 MG/DL (ref 74–99)
GLUCOSE UR STRIP.AUTO-MCNC: NORMAL MG/DL
HCG UR QL IA.RAPID: NEGATIVE
HCT VFR BLD AUTO: 38.5 % (ref 36–46)
HGB BLD-MCNC: 12.6 G/DL (ref 12–16)
IMM GRANULOCYTES # BLD AUTO: 0.01 X10*3/UL (ref 0–0.7)
IMM GRANULOCYTES NFR BLD AUTO: 0.1 % (ref 0–0.9)
KETONES UR STRIP.AUTO-MCNC: NEGATIVE MG/DL
LEUKOCYTE ESTERASE UR QL STRIP.AUTO: ABNORMAL
LYMPHOCYTES # BLD AUTO: 2.8 X10*3/UL (ref 1.2–4.8)
LYMPHOCYTES NFR BLD AUTO: 41.1 %
MAGNESIUM SERPL-MCNC: 1.84 MG/DL (ref 1.6–2.4)
MCH RBC QN AUTO: 28 PG (ref 26–34)
MCHC RBC AUTO-ENTMCNC: 32.7 G/DL (ref 32–36)
MCV RBC AUTO: 86 FL (ref 80–100)
MONOCYTES # BLD AUTO: 0.36 X10*3/UL (ref 0.1–1)
MONOCYTES NFR BLD AUTO: 5.3 %
MUCOUS THREADS #/AREA URNS AUTO: ABNORMAL /LPF
NEUTROPHILS # BLD AUTO: 3.4 X10*3/UL (ref 1.2–7.7)
NEUTROPHILS NFR BLD AUTO: 50 %
NITRITE UR QL STRIP.AUTO: NEGATIVE
NRBC BLD-RTO: 0 /100 WBCS (ref 0–0)
PH UR STRIP.AUTO: 5.5 [PH]
PLATELET # BLD AUTO: 242 X10*3/UL (ref 150–450)
POTASSIUM SERPL-SCNC: 3.4 MMOL/L (ref 3.5–5.3)
PROT SERPL-MCNC: 7 G/DL (ref 6.4–8.2)
PROT UR STRIP.AUTO-MCNC: ABNORMAL MG/DL
RBC # BLD AUTO: 4.5 X10*6/UL (ref 4–5.2)
RBC # UR STRIP.AUTO: ABNORMAL MG/DL
RBC #/AREA URNS AUTO: ABNORMAL /HPF
SODIUM SERPL-SCNC: 140 MMOL/L (ref 136–145)
SP GR UR STRIP.AUTO: 1.03
SQUAMOUS #/AREA URNS AUTO: ABNORMAL /HPF
T VAGINALIS SPEC QL WET PREP: NORMAL
UROBILINOGEN UR STRIP.AUTO-MCNC: NORMAL MG/DL
WBC # BLD AUTO: 6.8 X10*3/UL (ref 4.4–11.3)
WBC #/AREA URNS AUTO: ABNORMAL /HPF
WBC VAG QL WET PREP: NORMAL
YEAST VAG QL WET PREP: NORMAL

## 2025-02-08 PROCEDURE — 87210 SMEAR WET MOUNT SALINE/INK: CPT | Performed by: SURGERY

## 2025-02-08 PROCEDURE — 2550000001 HC RX 255 CONTRASTS: Performed by: SURGERY

## 2025-02-08 PROCEDURE — 2500000001 HC RX 250 WO HCPCS SELF ADMINISTERED DRUGS (ALT 637 FOR MEDICARE OP): Performed by: SURGERY

## 2025-02-08 PROCEDURE — 96372 THER/PROPH/DIAG INJ SC/IM: CPT | Performed by: SURGERY

## 2025-02-08 PROCEDURE — 87086 URINE CULTURE/COLONY COUNT: CPT | Mod: AHULAB | Performed by: SURGERY

## 2025-02-08 PROCEDURE — 81001 URINALYSIS AUTO W/SCOPE: CPT | Performed by: SURGERY

## 2025-02-08 PROCEDURE — 74177 CT ABD & PELVIS W/CONTRAST: CPT | Performed by: STUDENT IN AN ORGANIZED HEALTH CARE EDUCATION/TRAINING PROGRAM

## 2025-02-08 PROCEDURE — 83735 ASSAY OF MAGNESIUM: CPT | Performed by: SURGERY

## 2025-02-08 PROCEDURE — 85025 COMPLETE CBC W/AUTO DIFF WBC: CPT | Performed by: SURGERY

## 2025-02-08 PROCEDURE — 84702 CHORIONIC GONADOTROPIN TEST: CPT | Performed by: SURGERY

## 2025-02-08 PROCEDURE — 2500000004 HC RX 250 GENERAL PHARMACY W/ HCPCS (ALT 636 FOR OP/ED): Performed by: SURGERY

## 2025-02-08 PROCEDURE — 80053 COMPREHEN METABOLIC PANEL: CPT | Performed by: SURGERY

## 2025-02-08 PROCEDURE — 74177 CT ABD & PELVIS W/CONTRAST: CPT

## 2025-02-08 PROCEDURE — 36415 COLL VENOUS BLD VENIPUNCTURE: CPT | Performed by: SURGERY

## 2025-02-08 PROCEDURE — 81025 URINE PREGNANCY TEST: CPT | Performed by: SURGERY

## 2025-02-08 PROCEDURE — 87491 CHLMYD TRACH DNA AMP PROBE: CPT | Mod: AHULAB | Performed by: SURGERY

## 2025-02-08 RX ORDER — CEPHALEXIN 500 MG/1
500 CAPSULE ORAL 3 TIMES DAILY
Qty: 15 CAPSULE | Refills: 0 | Status: SHIPPED | OUTPATIENT
Start: 2025-02-08 | End: 2025-02-13

## 2025-02-08 RX ORDER — CEPHALEXIN 500 MG/1
500 CAPSULE ORAL ONCE
Status: COMPLETED | OUTPATIENT
Start: 2025-02-08 | End: 2025-02-08

## 2025-02-08 RX ORDER — KETOROLAC TROMETHAMINE 30 MG/ML
30 INJECTION, SOLUTION INTRAMUSCULAR; INTRAVENOUS ONCE
Status: COMPLETED | OUTPATIENT
Start: 2025-02-08 | End: 2025-02-08

## 2025-02-08 RX ADMIN — IOHEXOL 75 ML: 350 INJECTION, SOLUTION INTRAVENOUS at 02:03

## 2025-02-08 RX ADMIN — KETOROLAC TROMETHAMINE 30 MG: 30 INJECTION, SOLUTION INTRAMUSCULAR at 03:22

## 2025-02-08 RX ADMIN — CEPHALEXIN 500 MG: 500 CAPSULE ORAL at 03:22

## 2025-02-08 ASSESSMENT — PAIN SCALES - GENERAL: PAINLEVEL_OUTOF10: 0 - NO PAIN

## 2025-02-08 NOTE — ED TRIAGE NOTES
Pt from home c/o vaginal pain. Pt states she recently started antibiotics and usually gets a yeast infection when she's on antibiotics. Pt states starting today she started feeling vaginal pain. Pt denies any itching, smells, or discharge. Pt states its a hot feeling and this is what she assumes a vaginal prolapse would feel like.

## 2025-02-08 NOTE — ED PROVIDER NOTES
"Chief Complaint   Patient presents with    Vaginal pain     HPI:   Sharlene Smiley is an 29 y.o. female presenting to the ED for chief complaint of vaginal pain.  She explains over the last few days she has developed progressively worsening discomfort in her vagina.  Yesterday she felt it in her vaginal canal and states that today she has now developed lower abdominal pain.  She explains that she did notice something at her vaginal opening earlier today and severe irritation in the area.  She did take a photo of this and reports visible musculature without any protruding masses.  She reports some discomfort with urination in her lower abdomen with urgency..  No dysuria or frequency.  Bowel movements have been regular.  No nausea or vomiting.  No fevers chills sweats.  No chest pain or shortness of breath.      Allergies   Allergen Reactions    Pineapple Anaphylaxis    Shellfish Derived Anaphylaxis    Nsaids (Non-Steroidal Anti-Inflammatory Drug) Other     Hx Peptic ulcers    Pork Derived (Porcine) Other     Protestant     Topiramate Other     \"Stroke-like symptoms\"   :  Past Medical History:   Diagnosis Date    Chronic UTI (urinary tract infection)     Depression     NOT CURRENTLY ON MEDS    Endometriosis     GERD (gastroesophageal reflux disease)     History of TIA (transient ischemic attack) 2017    Stress induced, PT/OT x 2 months after incident    Migraine     WITH AURA    Personal history of peptic ulcer disease     History of peptic ulcer    PTSD (post-traumatic stress disorder)      Past Surgical History:   Procedure Laterality Date    APPENDECTOMY      CARPAL TUNNEL RELEASE Left     ENDOMETRIAL ABLATION      x2    MR HEAD ANGIO WO IV CONTRAST  08/25/2019    MR HEAD ANGIO WO IV CONTRAST 8/25/2019 [facility]    MR NECK ANGIO WO IV CONTRAST  08/25/2019    MR NECK ANGIO WO IV CONTRAST 8/25/2019 [facility]    PELVIC LAPAROSCOPY  2016     No family history on file.     Physical Exam  Vitals and " nursing note reviewed.   Constitutional:       General: She is not in acute distress.     Appearance: She is well-developed.   HENT:      Head: Normocephalic and atraumatic.      Right Ear: Tympanic membrane normal.      Left Ear: Tympanic membrane normal.      Nose: Nose normal.      Mouth/Throat:      Mouth: Mucous membranes are moist.      Pharynx: Oropharynx is clear.   Eyes:      Extraocular Movements: Extraocular movements intact.      Conjunctiva/sclera: Conjunctivae normal.      Pupils: Pupils are equal, round, and reactive to light.   Cardiovascular:      Rate and Rhythm: Normal rate and regular rhythm.      Heart sounds: No murmur heard.  Pulmonary:      Effort: Pulmonary effort is normal. No respiratory distress.      Breath sounds: Normal breath sounds.   Abdominal:      Palpations: Abdomen is soft.      Comments: Suprapubic tenderness   Genitourinary:     Comments: Uterine prolapse.  Cervix visible at the external vaginal opening IUD string in place.  Musculoskeletal:         General: No swelling.      Cervical back: Neck supple.   Skin:     General: Skin is warm and dry.      Capillary Refill: Capillary refill takes less than 2 seconds.   Neurological:      General: No focal deficit present.      Mental Status: She is alert and oriented to person, place, and time.   Psychiatric:         Mood and Affect: Mood normal.        VS: As documented in the triage note and EMR flowsheet from this visit were reviewed.    Medical Decision Making: This is a 29-year-old female presenting to the ED for chief complaint of lower abdominal pain with a vaginal mass and discharge.  Patient reports increased lower abdominal pain and vaginal pain with walking and ambulation.  On exam she does have some suprapubic tenderness and pain in the right lower quadrant.  Heart and lungs are clear.  ENT exam is benign.  No CVA tenderness.  Pelvic exam did reveal slight uterine prolapse the cervix was present at the vaginal opening  IUD strings in place.  Urinalysis was positive for UTI she was started on Keflex today.  Wet prep is negative for trichomonas, clue cells and yeast.  She was negative for pregnancy.  Labs did not show evidence of leukocytosis or metabolic derangement.  Kidney and liver function were normal.  CT imaging did not reveal any masses or acute pathology.  Discussed with patient likely uterine prolapse.  She was treated with Toradol and Keflex in the ED.  She was placed on Keflex for the next 5 days for outpatient management given a referral to follow-up with GYN.     Diagnoses as of 02/08/25 0458   Uterine prolapse   Cystitis     Counseling: Spoke with the patient and discussed today´s findings, in addition to providing specific details for the plan of care and expected course.  Patient was given the opportunity to ask questions.    Discussed return precautions and importance of follow-up.  Advised to follow-up with GYN.  I specifically advised to return to the ED for changing or worsening symptoms, new symptoms, complaint specific precautions, and precautions listed on the discharge paperwork.  Educated on the common potential side effects of medications prescribed.    I advised the patient that the emergency evaluation and treatment provided today doesn't end their need for medical care. It is very important that they follow-up with their primary care provider or other specialist as instructed.    The plan of care was mutually agreed upon with the patient. The patient and/or family were given the opportunity to ask questions. All questions asked today in the ED were answered to the best of my ability with today's information.    This report was transcribed using voice recognition software.  Every effort was made to ensure accuracy, however, inadvertently computerized transcription errors may be present.       Bhargav Cano PA-C  02/08/25 [id number]

## 2025-02-08 NOTE — DISCHARGE INSTRUCTIONS
You have been seen at a [facility].  Your exam was concerning for uterine prolapse.  You were diagnosed with a UTI today.  You are given a referral to follow-up with GYN.  Please follow-up with your primary care provider in the next 1 to 2 days for further evaluation and routine follow-up.  Please return to the emergency room if having any worsening symptoms.

## 2025-02-09 LAB
BACTERIA UR CULT: NORMAL
C TRACH RRNA SPEC QL NAA+PROBE: NEGATIVE
N GONORRHOEA DNA SPEC QL PROBE+SIG AMP: NEGATIVE